# Patient Record
Sex: FEMALE | Race: OTHER | Employment: OTHER | URBAN - METROPOLITAN AREA
[De-identification: names, ages, dates, MRNs, and addresses within clinical notes are randomized per-mention and may not be internally consistent; named-entity substitution may affect disease eponyms.]

---

## 2017-04-30 ENCOUNTER — HOSPITAL ENCOUNTER (OUTPATIENT)
Age: 77
Discharge: ANOTHER HOSPITAL | End: 2017-04-30
Attending: FAMILY MEDICINE
Payer: MEDICAID

## 2017-04-30 ENCOUNTER — HOSPITAL ENCOUNTER (EMERGENCY)
Facility: HOSPITAL | Age: 77
Discharge: HOME | End: 2017-04-30
Attending: EMERGENCY MEDICINE
Payer: MEDICAID

## 2017-04-30 ENCOUNTER — APPOINTMENT (OUTPATIENT)
Dept: GENERAL RADIOLOGY | Age: 77
End: 2017-04-30
Attending: FAMILY MEDICINE
Payer: MEDICAID

## 2017-04-30 VITALS
OXYGEN SATURATION: 97 % | HEART RATE: 73 BPM | DIASTOLIC BLOOD PRESSURE: 81 MMHG | BODY MASS INDEX: 36.72 KG/M2 | WEIGHT: 207.25 LBS | TEMPERATURE: 98 F | RESPIRATION RATE: 20 BRPM | SYSTOLIC BLOOD PRESSURE: 156 MMHG | HEIGHT: 63 IN

## 2017-04-30 VITALS
OXYGEN SATURATION: 97 % | SYSTOLIC BLOOD PRESSURE: 141 MMHG | TEMPERATURE: 98 F | HEART RATE: 77 BPM | DIASTOLIC BLOOD PRESSURE: 85 MMHG | RESPIRATION RATE: 24 BRPM

## 2017-04-30 DIAGNOSIS — R06.02 SHORTNESS OF BREATH: Primary | ICD-10-CM

## 2017-04-30 DIAGNOSIS — J98.01 ACUTE BRONCHOSPASM: ICD-10-CM

## 2017-04-30 DIAGNOSIS — J06.9 UPPER RESPIRATORY TRACT INFECTION, UNSPECIFIED TYPE: Primary | ICD-10-CM

## 2017-04-30 PROCEDURE — 99285 EMERGENCY DEPT VISIT HI MDM: CPT

## 2017-04-30 PROCEDURE — 93005 ELECTROCARDIOGRAM TRACING: CPT

## 2017-04-30 PROCEDURE — 93010 ELECTROCARDIOGRAM REPORT: CPT

## 2017-04-30 PROCEDURE — 83880 ASSAY OF NATRIURETIC PEPTIDE: CPT | Performed by: EMERGENCY MEDICINE

## 2017-04-30 PROCEDURE — 85025 COMPLETE CBC W/AUTO DIFF WBC: CPT | Performed by: EMERGENCY MEDICINE

## 2017-04-30 PROCEDURE — 94640 AIRWAY INHALATION TREATMENT: CPT

## 2017-04-30 PROCEDURE — 99284 EMERGENCY DEPT VISIT MOD MDM: CPT

## 2017-04-30 PROCEDURE — 84484 ASSAY OF TROPONIN QUANT: CPT | Performed by: EMERGENCY MEDICINE

## 2017-04-30 PROCEDURE — 80053 COMPREHEN METABOLIC PANEL: CPT | Performed by: EMERGENCY MEDICINE

## 2017-04-30 PROCEDURE — 99205 OFFICE O/P NEW HI 60 MIN: CPT

## 2017-04-30 PROCEDURE — 71020 XR CHEST PA + LAT CHEST (CPT=71020): CPT

## 2017-04-30 PROCEDURE — 36415 COLL VENOUS BLD VENIPUNCTURE: CPT

## 2017-04-30 RX ORDER — PREDNISONE 20 MG/1
40 TABLET ORAL ONCE
Status: COMPLETED | OUTPATIENT
Start: 2017-04-30 | End: 2017-04-30

## 2017-04-30 RX ORDER — ALBUTEROL SULFATE 90 UG/1
2 AEROSOL, METERED RESPIRATORY (INHALATION) EVERY 4 HOURS PRN
Qty: 1 INHALER | Refills: 0 | Status: SHIPPED | OUTPATIENT
Start: 2017-04-30 | End: 2017-05-30

## 2017-04-30 RX ORDER — PREDNISONE 20 MG/1
40 TABLET ORAL DAILY
Qty: 10 TABLET | Refills: 0 | Status: SHIPPED | OUTPATIENT
Start: 2017-04-30 | End: 2017-05-05

## 2017-04-30 RX ORDER — SIMVASTATIN 20 MG
20 TABLET ORAL NIGHTLY
COMMUNITY
End: 2017-05-30

## 2017-04-30 RX ORDER — IPRATROPIUM BROMIDE AND ALBUTEROL SULFATE 2.5; .5 MG/3ML; MG/3ML
3 SOLUTION RESPIRATORY (INHALATION) ONCE
Status: COMPLETED | OUTPATIENT
Start: 2017-04-30 | End: 2017-04-30

## 2017-04-30 RX ORDER — METOPROLOL TARTRATE 100 MG/1
100 TABLET ORAL 2 TIMES DAILY
COMMUNITY
End: 2017-11-20

## 2017-04-30 NOTE — ED PROVIDER NOTES
Patient Seen in: [facility] In [facility]    History   Patient presents with:  Shortness Of Breath    Stated Complaint: SOB X 1 DAY    HPI    This 39-year-old female is brought to the office by her son for evaluation of shortness of breath wh 04/30/17 1441 None (Room air)       Current:/81 mmHg  Pulse 91  Temp(Src) 97.6 °F (36.4 °C) (Temporal)  Resp 26  SpO2 97%        Physical Exam    General: WH/WN/WD, with audible wheezing noted, A and O times 3  HEAD: Normocephalic, atraumatic  EYES: patient has been reexamined after each of those nebulizer treatments. She continues to have diffuse inspiratory and expiratory wheezing. She continues to feel short of breath. I advised the son that there is no evidence for pneumonia.   I suspect she h

## 2017-04-30 NOTE — ED INITIAL ASSESSMENT (HPI)
Pt c/o cough 3 days and BRAYAN and wheezing onset last night, pt denies pain, pt reports fever of 100.5 earlier today at 1000. Pt has increased swelling to LE. Pt was seen at immediate care today, CXR done, neg for pneumonia, sent her for further work up.

## 2017-04-30 NOTE — ED PROVIDER NOTES
Patient Seen in: [facility] Emergency Department    History   Patient presents with:  Dyspnea PENNY SOB (respiratory)    Stated Complaint: penny    HPI    26-year-old female presents with shortness of breath and cough.   Family reports fever to 100.5°F e All other systems reviewed and negative except as noted above. PSFH elements reviewed from today and agreed except as otherwise stated in HPI.     Physical Exam       ED Triage Vitals   BP 04/30/17 1730 161/80 mmHg   Pulse 04/30/17 1730 75   Resp 04/ Please view results for these tests on the individual orders. RAINBOW DRAW BLUE   RAINBOW DRAW GOLD   RAINBOW DRAW LAVENDER   RAINBOW DRAW LIGHT GREEN      EKG    Rate, intervals and axes as noted on EKG Report.   Rate: 72  Rhythm: Sinus Rhythm  Readi unspecified type  (primary encounter diagnosis)    Disposition:  Discharge    Follow-up:  Natalie Akers, [address] [facility]  [phone]    Schedule an appointment as soon as possible for a visit        Medications Prescribed:

## 2017-04-30 NOTE — ED INITIAL ASSESSMENT (HPI)
Shortness of breath- started yesterday night  Cough- x 2 days  Took mucinex, cough syrup and night and cough drops. Pt son [place].    Fever-  100.5 temp this morning she took ibuprofen 1030 am

## 2017-05-01 NOTE — ED NOTES
RE-EVAL PER MD AND OK FOR DC WITH FU INST TO PMD IN 1-2 DAYS AS DIRECTED. REVIEW OF LABS/EKG, QUESTIONS ANSWERED NM JAMARCUS LUI  IN AGREEMENT WITH POC.  AMB + GAIT WITH SPOUSE

## 2017-05-09 ENCOUNTER — OFFICE VISIT (OUTPATIENT)
Dept: FAMILY MEDICINE CLINIC | Facility: CLINIC | Age: 77
End: 2017-05-09

## 2017-05-09 ENCOUNTER — MED REC SCAN ONLY (OUTPATIENT)
Dept: FAMILY MEDICINE CLINIC | Facility: CLINIC | Age: 77
End: 2017-05-09

## 2017-05-09 VITALS
HEART RATE: 74 BPM | RESPIRATION RATE: 16 BRPM | OXYGEN SATURATION: 98 % | SYSTOLIC BLOOD PRESSURE: 122 MMHG | HEIGHT: 63 IN | WEIGHT: 207 LBS | TEMPERATURE: 99 F | BODY MASS INDEX: 36.68 KG/M2 | DIASTOLIC BLOOD PRESSURE: 80 MMHG

## 2017-05-09 DIAGNOSIS — J40 BRONCHITIS: Primary | ICD-10-CM

## 2017-05-09 DIAGNOSIS — Z76.89 ESTABLISHING CARE WITH NEW DOCTOR, ENCOUNTER FOR: ICD-10-CM

## 2017-05-09 DIAGNOSIS — M81.0 OSTEOPOROSIS, UNSPECIFIED OSTEOPOROSIS TYPE, UNSPECIFIED PATHOLOGICAL FRACTURE PRESENCE: ICD-10-CM

## 2017-05-09 PROBLEM — E66.9 OBESITY: Status: ACTIVE | Noted: 2017-05-09

## 2017-05-09 PROBLEM — E78.00 HIGH CHOLESTEROL: Status: ACTIVE | Noted: 2017-05-09

## 2017-05-09 PROBLEM — I10 HYPERTENSION: Status: ACTIVE | Noted: 2017-05-09

## 2017-05-09 PROCEDURE — 99203 OFFICE O/P NEW LOW 30 MIN: CPT | Performed by: FAMILY MEDICINE

## 2017-05-09 RX ORDER — ALPRAZOLAM 0.5 MG/1
TABLET ORAL
Refills: 5 | COMMUNITY
Start: 2017-02-09 | End: 2017-06-02

## 2017-05-09 RX ORDER — LISINOPRIL 10 MG/1
10 TABLET ORAL DAILY
Qty: 30 TABLET | Refills: 2 | Status: SHIPPED | OUTPATIENT
Start: 2017-05-09 | End: 2017-08-23

## 2017-05-09 RX ORDER — TOLTERODINE 4 MG/1
4 CAPSULE, EXTENDED RELEASE ORAL DAILY
Qty: 30 CAPSULE | Refills: 0 | COMMUNITY
Start: 2017-05-09 | End: 2017-09-14

## 2017-05-09 RX ORDER — AZITHROMYCIN 250 MG/1
TABLET, FILM COATED ORAL
Qty: 6 TABLET | Refills: 0 | Status: SHIPPED | OUTPATIENT
Start: 2017-05-09 | End: 2017-11-29 | Stop reason: ALTCHOICE

## 2017-05-09 RX ORDER — METOPROLOL SUCCINATE 100 MG/1
100 TABLET, EXTENDED RELEASE ORAL DAILY
Qty: 30 TABLET | Refills: 2 | Status: SHIPPED | OUTPATIENT
Start: 2017-05-09 | End: 2017-08-23

## 2017-05-09 RX ORDER — ASPIRIN 81 MG/1
TABLET ORAL
COMMUNITY
Start: 2017-05-05 | End: 2017-09-15

## 2017-05-09 RX ORDER — IBANDRONATE SODIUM 150 MG/1
TABLET, FILM COATED ORAL
Refills: 2 | COMMUNITY
Start: 2017-03-11 | End: 2017-06-02

## 2017-05-09 NOTE — PATIENT INSTRUCTIONS
Bronchitis, Antibiotic Treatment (Adult)    Bronchitis is an infection of the air passages (bronchial tubes) in your lungs. It often occurs when you have a cold.  This illness is contagious during the first few days and is spread through the air by coughi · Finish all antibiotic medicine. Do this even if you are feeling better after only a few days. Follow-up care  Follow up with your healthcare provider, or as advised. If you had an X-ray or ECG (electrocardiogram), a specialist will review it.  You will b During young adulthood, bones become their strongest. This is called peak bone mass. The same good habits that kept bones healthy in childhood help keep bone healthy in adulthood. Age 27 to menopause  Bone mass declines slightly during these years.  Your b

## 2017-05-09 NOTE — PROGRESS NOTES
HPI:    Patient ID: Susana  is a 67year old female. HPI   76yr obese female presents with son for c/o cough and to establish care. C/o cough, wheezing and cough congestion over the past 2wks.  Was seen in the UC and ER on 4/30 and diagnosed wit Take 1 capsule (4 mg total) by mouth daily. Disp: 30 capsule Rfl: 0   Metoprolol Tartrate 100 MG Oral Tab Take 100 mg by mouth 2 (two) times daily. Disp:  Rfl:    simvastatin 20 MG Oral Tab Take 20 mg by mouth nightly.  Disp:  Rfl:    Albuterol Sulfate HFA tablet 2      Sig: Take 1 tablet (10 mg total) by mouth daily. Metoprolol Succinate  MG Oral Tablet 24 Hr 30 tablet 2      Sig: Take 1 tablet (100 mg total) by mouth daily.            Imaging & Referrals:  XR DEXA BONE DENSITOMETRY (CPT=90180)

## 2017-05-30 RX ORDER — SIMVASTATIN 20 MG
20 TABLET ORAL NIGHTLY
Qty: 90 TABLET | Refills: 0 | Status: SHIPPED | OUTPATIENT
Start: 2017-05-30 | End: 2017-08-24

## 2017-05-30 NOTE — TELEPHONE ENCOUNTER
Refill request for simvastatin. Protocol failed, unable to approve. LOV 5/9/2017.  Please advise, thank you

## 2017-06-02 DIAGNOSIS — M81.0 OSTEOPOROSIS, UNSPECIFIED OSTEOPOROSIS TYPE, UNSPECIFIED PATHOLOGICAL FRACTURE PRESENCE: Primary | ICD-10-CM

## 2017-06-02 NOTE — TELEPHONE ENCOUNTER
Refills have been requested for Alprazolam and Ibandronate which Pt was taking prior to 05/09/17 Appt. Will you authorize the pended refills ? Routed to Dr Kwasi Jackson.

## 2017-06-03 RX ORDER — ALPRAZOLAM 0.5 MG/1
TABLET ORAL
Qty: 30 TABLET | Refills: 0 | OUTPATIENT
Start: 2017-06-03 | End: 2017-08-24

## 2017-06-03 RX ORDER — IBANDRONATE SODIUM 150 MG/1
TABLET, FILM COATED ORAL
Qty: 1 TABLET | Refills: 0 | Status: SHIPPED | OUTPATIENT
Start: 2017-06-03 | End: 2017-08-01

## 2017-06-03 NOTE — TELEPHONE ENCOUNTER
Pt needs updated DEXA scan to evaluate continued need for alendronate. Pls have pt schedule and then f/u in clinic.

## 2017-07-10 DIAGNOSIS — M81.0 OSTEOPOROSIS, UNSPECIFIED OSTEOPOROSIS TYPE, UNSPECIFIED PATHOLOGICAL FRACTURE PRESENCE: ICD-10-CM

## 2017-07-10 RX ORDER — IBANDRONATE SODIUM 150 MG/1
TABLET, FILM COATED ORAL
Qty: 1 TABLET | Refills: 0 | OUTPATIENT
Start: 2017-07-10

## 2017-07-10 NOTE — TELEPHONE ENCOUNTER
Pending Prescriptions Disp Refills    Ibandronate Sodium 150 MG Oral Tab 1 tablet 0     Sig: TAKE 1TAB BY MOUTH EVERY MONTH ON EMPTY STOMACH WITH GLASS OF WATER. DONT EAT/LIE DOWN X60MIN AFTER       Lov5/9/2017, No future appointments. patient was only gi

## 2017-08-01 ENCOUNTER — TELEPHONE (OUTPATIENT)
Dept: FAMILY MEDICINE CLINIC | Facility: CLINIC | Age: 77
End: 2017-08-01

## 2017-08-01 DIAGNOSIS — M81.0 OSTEOPOROSIS, UNSPECIFIED OSTEOPOROSIS TYPE, UNSPECIFIED PATHOLOGICAL FRACTURE PRESENCE: ICD-10-CM

## 2017-08-01 RX ORDER — IBANDRONATE SODIUM 150 MG/1
TABLET, FILM COATED ORAL
Qty: 1 TABLET | Refills: 0 | Status: SHIPPED | OUTPATIENT
Start: 2017-08-01 | End: 2017-09-14

## 2017-08-01 NOTE — TELEPHONE ENCOUNTER
Signed Prescriptions Disp Refills    Ibandronate Sodium 150 MG Oral Tab 1 tablet 0      Sig: TAKE 1TAB BY MOUTH EVERY MONTH ON EMPTY STOMACH WITH GLASS OF WATER.  DONT EAT/LIE DOWN [id number] AFTER        Authorizing Provider: Alonzo Larios

## 2017-08-07 ENCOUNTER — TELEPHONE (OUTPATIENT)
Dept: FAMILY MEDICINE CLINIC | Facility: CLINIC | Age: 77
End: 2017-08-07

## 2017-08-07 NOTE — TELEPHONE ENCOUNTER
[facility] pharmacy called to check status on prior authorization for Ibandronate Sodium 150 MG Oral Tab

## 2017-08-09 ENCOUNTER — TELEPHONE (OUTPATIENT)
Dept: FAMILY MEDICINE CLINIC | Facility: CLINIC | Age: 77
End: 2017-08-09

## 2017-08-09 NOTE — TELEPHONE ENCOUNTER
Erlinda Sahni from [facility] called to f/u to see if Prior Auth was recd for the \"Ibandronate\".    Fax # [phone]

## 2017-08-11 ENCOUNTER — MED REC SCAN ONLY (OUTPATIENT)
Dept: FAMILY MEDICINE CLINIC | Facility: CLINIC | Age: 77
End: 2017-08-11

## 2017-08-16 ENCOUNTER — TELEPHONE (OUTPATIENT)
Dept: FAMILY MEDICINE CLINIC | Facility: CLINIC | Age: 77
End: 2017-08-16

## 2017-08-23 RX ORDER — METOPROLOL SUCCINATE 100 MG/1
100 TABLET, EXTENDED RELEASE ORAL DAILY
Qty: 30 TABLET | Refills: 2 | Status: SHIPPED | OUTPATIENT
Start: 2017-08-23 | End: 2017-08-24

## 2017-08-23 RX ORDER — LISINOPRIL 10 MG/1
10 TABLET ORAL DAILY
Qty: 30 TABLET | Refills: 2 | Status: SHIPPED | OUTPATIENT
Start: 2017-08-23 | End: 2017-08-24

## 2017-08-23 RX ORDER — SIMVASTATIN 20 MG
20 TABLET ORAL NIGHTLY
Qty: 90 TABLET | Refills: 0 | Status: CANCELLED | OUTPATIENT
Start: 2017-08-23

## 2017-08-24 DIAGNOSIS — E78.00 HIGH CHOLESTEROL: Primary | ICD-10-CM

## 2017-08-24 DIAGNOSIS — I10 ESSENTIAL HYPERTENSION: ICD-10-CM

## 2017-08-24 DIAGNOSIS — E55.9 VITAMIN D DEFICIENCY: ICD-10-CM

## 2017-08-24 RX ORDER — SIMVASTATIN 20 MG
20 TABLET ORAL NIGHTLY
Qty: 30 TABLET | Refills: 0 | Status: SHIPPED | OUTPATIENT
Start: 2017-08-24 | End: 2017-09-14

## 2017-08-24 RX ORDER — METOPROLOL SUCCINATE 100 MG/1
100 TABLET, EXTENDED RELEASE ORAL DAILY
Qty: 90 TABLET | Refills: 0 | Status: SHIPPED | OUTPATIENT
Start: 2017-08-24 | End: 2018-01-01 | Stop reason: ALTCHOICE

## 2017-08-24 RX ORDER — LISINOPRIL 10 MG/1
10 TABLET ORAL DAILY
Qty: 90 TABLET | Refills: 0 | Status: SHIPPED | OUTPATIENT
Start: 2017-08-24 | End: 2017-11-20

## 2017-08-24 NOTE — TELEPHONE ENCOUNTER
Pending Prescriptions Disp Refills    ALPRAZolam 0.5 MG Oral Tab 30 tablet 0     Sig: TAKE 1 TABLET BY MOUTH EVERY DAY AT BEDTIME       Signed Prescriptions Disp Refills    simvastatin 20 MG Oral Tab 30 tablet 0      Sig: Take 1 tablet (20 mg total) by m

## 2017-08-25 RX ORDER — ALPRAZOLAM 0.5 MG/1
TABLET ORAL
Qty: 30 TABLET | Refills: 0 | Status: SHIPPED | OUTPATIENT
Start: 2017-08-25 | End: 2017-11-29

## 2017-09-07 ENCOUNTER — HOSPITAL ENCOUNTER (OUTPATIENT)
Dept: BONE DENSITY | Age: 77
Discharge: HOME | End: 2017-09-07
Attending: FAMILY MEDICINE
Payer: COMMERCIAL

## 2017-09-07 DIAGNOSIS — M81.0 OSTEOPOROSIS, UNSPECIFIED OSTEOPOROSIS TYPE, UNSPECIFIED PATHOLOGICAL FRACTURE PRESENCE: ICD-10-CM

## 2017-09-07 PROCEDURE — 77080 DXA BONE DENSITY AXIAL: CPT | Performed by: FAMILY MEDICINE

## 2017-09-08 ENCOUNTER — TELEPHONE (OUTPATIENT)
Dept: FAMILY MEDICINE CLINIC | Facility: CLINIC | Age: 77
End: 2017-09-08

## 2017-09-08 DIAGNOSIS — E66.9 OBESITY, UNSPECIFIED CLASSIFICATION, UNSPECIFIED OBESITY TYPE, UNSPECIFIED WHETHER SERIOUS COMORBIDITY PRESENT: ICD-10-CM

## 2017-09-08 DIAGNOSIS — I10 ESSENTIAL HYPERTENSION: Primary | ICD-10-CM

## 2017-09-08 NOTE — TELEPHONE ENCOUNTER
----- Message from [facility], DO sent at 9/8/2017  3:41 PM CDT -----  Pls have pt f/u on DEXA results and have pt do fasting labs before scheduling f/u visit.

## 2017-09-12 ENCOUNTER — TELEPHONE (OUTPATIENT)
Dept: FAMILY MEDICINE CLINIC | Facility: CLINIC | Age: 77
End: 2017-09-12

## 2017-09-12 ENCOUNTER — APPOINTMENT (OUTPATIENT)
Dept: LAB | Age: 77
End: 2017-09-12
Attending: INTERNAL MEDICINE
Payer: COMMERCIAL

## 2017-09-12 DIAGNOSIS — R35.0 URINARY FREQUENCY: ICD-10-CM

## 2017-09-12 DIAGNOSIS — I10 ESSENTIAL HYPERTENSION: ICD-10-CM

## 2017-09-12 DIAGNOSIS — I10 ESSENTIAL HYPERTENSION: Primary | ICD-10-CM

## 2017-09-12 DIAGNOSIS — E78.00 HIGH CHOLESTEROL: ICD-10-CM

## 2017-09-12 DIAGNOSIS — E66.9 OBESITY, UNSPECIFIED CLASSIFICATION, UNSPECIFIED OBESITY TYPE, UNSPECIFIED WHETHER SERIOUS COMORBIDITY PRESENT: ICD-10-CM

## 2017-09-12 LAB
25-HYDROXYVITAMIN D (TOTAL): 26.8 NG/ML (ref 30–100)
ALBUMIN SERPL-MCNC: 3.7 G/DL (ref 3.5–4.8)
ALP LIVER SERPL-CCNC: 66 U/L (ref 55–142)
ALT SERPL-CCNC: 21 U/L (ref 14–54)
AST SERPL-CCNC: 16 U/L (ref 15–41)
BILIRUB SERPL-MCNC: 1.1 MG/DL (ref 0.1–2)
BILIRUB UR QL STRIP.AUTO: NEGATIVE
BUN BLD-MCNC: 20 MG/DL (ref 8–20)
CALCIUM BLD-MCNC: 9.1 MG/DL (ref 8.3–10.3)
CHLORIDE: 109 MMOL/L (ref 101–111)
CHOLEST SMN-MCNC: 166 MG/DL (ref ?–200)
CO2: 27 MMOL/L (ref 22–32)
COLOR UR AUTO: YELLOW
CREAT BLD-MCNC: 0.97 MG/DL (ref 0.55–1.02)
EST. AVERAGE GLUCOSE BLD GHB EST-MCNC: 120 MG/DL (ref 68–126)
FREE T4: 1.1 NG/DL (ref 0.9–1.8)
GLUCOSE BLD-MCNC: 89 MG/DL (ref 70–99)
GLUCOSE UR STRIP.AUTO-MCNC: NEGATIVE MG/DL
HBA1C MFR BLD HPLC: 5.8 % (ref ?–5.7)
HDLC SERPL-MCNC: 37 MG/DL (ref 45–?)
HDLC SERPL: 4.49 {RATIO} (ref ?–4.44)
KETONES UR STRIP.AUTO-MCNC: NEGATIVE MG/DL
LDLC SERPL CALC-MCNC: 96 MG/DL (ref ?–130)
LDLC SERPL-MCNC: 33 MG/DL (ref 5–40)
LEUKOCYTE ESTERASE UR QL STRIP.AUTO: NEGATIVE
M PROTEIN MFR SERPL ELPH: 7.3 G/DL (ref 6.1–8.3)
NITRITE UR QL STRIP.AUTO: NEGATIVE
NONHDLC SERPL-MCNC: 129 MG/DL (ref ?–130)
PH UR STRIP.AUTO: 5 [PH] (ref 4.5–8)
POTASSIUM SERPL-SCNC: 4.4 MMOL/L (ref 3.6–5.1)
PROT UR STRIP.AUTO-MCNC: NEGATIVE MG/DL
RBC UR QL AUTO: NEGATIVE
SODIUM SERPL-SCNC: 143 MMOL/L (ref 136–144)
SP GR UR STRIP.AUTO: 1.02 (ref 1–1.03)
TRIGLYCERIDES: 167 MG/DL (ref ?–150)
TSI SER-ACNC: 1.82 MIU/ML (ref 0.35–5.5)
UROBILINOGEN UR STRIP.AUTO-MCNC: <2 MG/DL

## 2017-09-12 PROCEDURE — 36415 COLL VENOUS BLD VENIPUNCTURE: CPT | Performed by: FAMILY MEDICINE

## 2017-09-12 PROCEDURE — 81003 URINALYSIS AUTO W/O SCOPE: CPT | Performed by: FAMILY MEDICINE

## 2017-09-12 PROCEDURE — 84443 ASSAY THYROID STIM HORMONE: CPT | Performed by: FAMILY MEDICINE

## 2017-09-12 PROCEDURE — 80061 LIPID PANEL: CPT | Performed by: FAMILY MEDICINE

## 2017-09-12 PROCEDURE — 80053 COMPREHEN METABOLIC PANEL: CPT | Performed by: FAMILY MEDICINE

## 2017-09-12 PROCEDURE — 82306 VITAMIN D 25 HYDROXY: CPT | Performed by: FAMILY MEDICINE

## 2017-09-12 PROCEDURE — 83036 HEMOGLOBIN GLYCOSYLATED A1C: CPT | Performed by: FAMILY MEDICINE

## 2017-09-12 PROCEDURE — 84439 ASSAY OF FREE THYROXINE: CPT | Performed by: FAMILY MEDICINE

## 2017-09-12 NOTE — TELEPHONE ENCOUNTER
Pt is requesting to have a urinalysis test added to orders pt is going to have test done an hour, said pt s/w doctor at last visit about frequent urination issues

## 2017-09-14 ENCOUNTER — OFFICE VISIT (OUTPATIENT)
Dept: FAMILY MEDICINE CLINIC | Facility: CLINIC | Age: 77
End: 2017-09-14

## 2017-09-14 VITALS
HEART RATE: 61 BPM | BODY MASS INDEX: 35.88 KG/M2 | RESPIRATION RATE: 16 BRPM | OXYGEN SATURATION: 96 % | WEIGHT: 202.5 LBS | HEIGHT: 63 IN | TEMPERATURE: 99 F | DIASTOLIC BLOOD PRESSURE: 80 MMHG | SYSTOLIC BLOOD PRESSURE: 134 MMHG

## 2017-09-14 DIAGNOSIS — E78.5 HYPERLIPIDEMIA, UNSPECIFIED HYPERLIPIDEMIA TYPE: ICD-10-CM

## 2017-09-14 DIAGNOSIS — M81.0 OSTEOPOROSIS, UNSPECIFIED OSTEOPOROSIS TYPE, UNSPECIFIED PATHOLOGICAL FRACTURE PRESENCE: ICD-10-CM

## 2017-09-14 DIAGNOSIS — I10 ESSENTIAL HYPERTENSION: ICD-10-CM

## 2017-09-14 DIAGNOSIS — R06.00 DOE (DYSPNEA ON EXERTION): Primary | ICD-10-CM

## 2017-09-14 DIAGNOSIS — N32.81 OAB (OVERACTIVE BLADDER): ICD-10-CM

## 2017-09-14 DIAGNOSIS — R60.0 BILATERAL LOWER EXTREMITY EDEMA: ICD-10-CM

## 2017-09-14 DIAGNOSIS — M81.0 OSTEOPOROSIS WITHOUT CURRENT PATHOLOGICAL FRACTURE, UNSPECIFIED OSTEOPOROSIS TYPE: ICD-10-CM

## 2017-09-14 DIAGNOSIS — Z23 NEED FOR INFLUENZA VACCINATION: ICD-10-CM

## 2017-09-14 DIAGNOSIS — E55.9 VITAMIN D DEFICIENCY: ICD-10-CM

## 2017-09-14 PROCEDURE — 99214 OFFICE O/P EST MOD 30 MIN: CPT | Performed by: FAMILY MEDICINE

## 2017-09-14 PROCEDURE — 90471 IMMUNIZATION ADMIN: CPT | Performed by: FAMILY MEDICINE

## 2017-09-14 PROCEDURE — 90653 IIV ADJUVANT VACCINE IM: CPT | Performed by: FAMILY MEDICINE

## 2017-09-14 RX ORDER — TOLTERODINE 4 MG/1
4 CAPSULE, EXTENDED RELEASE ORAL DAILY
Qty: 30 CAPSULE | Refills: 2 | Status: SHIPPED | OUTPATIENT
Start: 2017-09-14 | End: 2017-10-12

## 2017-09-14 RX ORDER — SIMVASTATIN 20 MG
20 TABLET ORAL NIGHTLY
Qty: 90 TABLET | Refills: 1 | Status: SHIPPED | OUTPATIENT
Start: 2017-09-14 | End: 2017-10-12

## 2017-09-14 RX ORDER — IBANDRONATE SODIUM 150 MG/1
TABLET, FILM COATED ORAL
Qty: 1 TABLET | Refills: 2 | Status: SHIPPED | OUTPATIENT
Start: 2017-09-14 | End: 2017-10-12

## 2017-09-14 NOTE — TELEPHONE ENCOUNTER
Refill as per protocol.     LOV 9/14/2017    Future Appointments  Date Time Provider Kevin Cantu   9/21/2017 1:00 PM [facility] CARD [facility] [place]          Signed Prescriptions Disp Refills    Ibandronate Sodium 150 MG Oral Tab 1 tablet 2

## 2017-09-14 NOTE — PROGRESS NOTES
Ariel Herrera is a 67year old female. HPI:   78yr old female presents with son for f/u on recent labs, DEXA and c/o GONSALEZ and BLE edema and f/u on chronic conditions.   Son has noted that she seems more short of breath with going up a flight of stairs Take 100 mg by mouth 2 (two) times daily. Disp:  Rfl:    aspirin 81 MG Oral Tab EC Take 1 tablet (81 mg total) by mouth every evening.  Disp: 90 tablet Rfl: 2   Ibandronate Sodium 150 MG Oral Tab TAKE 1TAB BY MOUTH EVERY MONTH ON EMPTY STOMACH WITH GLASS OF daily asa 81mg and statin  - CARD ECHO 2D DOPPLER (CPT=93306); Future    3. Essential hypertension  - bp stable  - cpm    4.  Hyperlipidemia, unspecified hyperlipidemia type  - lipid panel (9/12/17): , , HD 37, LDL 96  - cont statin and asa autumn

## 2017-09-15 RX ORDER — ASPIRIN 81 MG/1
81 TABLET ORAL EVERY EVENING
Qty: 90 TABLET | Refills: 2 | Status: SHIPPED | OUTPATIENT
Start: 2017-09-15 | End: 2017-10-12

## 2017-09-15 NOTE — TELEPHONE ENCOUNTER
Pending Prescriptions Disp Refills    aspirin 81 MG Oral Tab EC  0     Lov9/14/2017, Future Appointments  Date Time Provider Kevin Cantu   9/21/2017 1:00 PM [address] ECHO [address] [facility] CARD EM [place]       Please see pended medication, unable to fi

## 2017-09-21 ENCOUNTER — HOSPITAL ENCOUNTER (OUTPATIENT)
Dept: CV DIAGNOSTICS | Facility: HOSPITAL | Age: 77
Discharge: HOME | End: 2017-09-21
Attending: FAMILY MEDICINE
Payer: COMMERCIAL

## 2017-09-21 DIAGNOSIS — R06.00 DOE (DYSPNEA ON EXERTION): ICD-10-CM

## 2017-09-21 DIAGNOSIS — I10 ESSENTIAL HYPERTENSION: ICD-10-CM

## 2017-09-21 DIAGNOSIS — R60.0 BILATERAL LOWER EXTREMITY EDEMA: ICD-10-CM

## 2017-09-21 PROCEDURE — 93306 TTE W/DOPPLER COMPLETE: CPT | Performed by: FAMILY MEDICINE

## 2017-10-10 DIAGNOSIS — M81.0 OSTEOPOROSIS, UNSPECIFIED OSTEOPOROSIS TYPE, UNSPECIFIED PATHOLOGICAL FRACTURE PRESENCE: ICD-10-CM

## 2017-10-10 DIAGNOSIS — N32.81 OAB (OVERACTIVE BLADDER): ICD-10-CM

## 2017-10-10 DIAGNOSIS — E78.5 HYPERLIPIDEMIA, UNSPECIFIED HYPERLIPIDEMIA TYPE: ICD-10-CM

## 2017-10-10 NOTE — TELEPHONE ENCOUNTER
Per pharmacy, patient is no longer with them and they couldn't determine what the telephone message was about.

## 2017-10-11 NOTE — TELEPHONE ENCOUNTER
MRALENE FORD [facility] called re: changes to 2 of pt's Rx's due to Ins issues said [facility] no longer accepts pt's Ins

## 2017-10-12 RX ORDER — SIMVASTATIN 20 MG
20 TABLET ORAL NIGHTLY
Qty: 90 TABLET | Refills: 1 | Status: SHIPPED | OUTPATIENT
Start: 2017-10-12 | End: 2018-05-18

## 2017-10-12 RX ORDER — TOLTERODINE 4 MG/1
4 CAPSULE, EXTENDED RELEASE ORAL DAILY
Qty: 30 CAPSULE | Refills: 2 | Status: SHIPPED | OUTPATIENT
Start: 2017-10-12 | End: 2018-04-26

## 2017-10-12 RX ORDER — IBANDRONATE SODIUM 150 MG/1
TABLET, FILM COATED ORAL
Qty: 1 TABLET | Refills: 2 | Status: SHIPPED | OUTPATIENT
Start: 2017-10-12 | End: 2018-05-23

## 2017-10-12 RX ORDER — ASPIRIN 81 MG/1
81 TABLET ORAL EVERY EVENING
Qty: 90 TABLET | Refills: 2 | Status: SHIPPED | OUTPATIENT
Start: 2017-10-12 | End: 2019-01-01

## 2017-10-12 NOTE — TELEPHONE ENCOUNTER
Patient's son returned call stating that his Mother now has Tez. Updated registration to reflect new ID/Grp #. He asked if you can call him with any problems with the refill.

## 2017-10-12 NOTE — TELEPHONE ENCOUNTER
Signed Prescriptions Disp Refills    Ibandronate Sodium 150 MG Oral Tab 1 tablet 2      Sig: TAKE 1TAB BY MOUTH EVERY MONTH ON EMPTY STOMACH WITH GLASS OF WATER.  DONT EAT/LIE DOWN [id number] AFTER        Authorizing Provider: Dewayne Palm

## 2017-10-12 NOTE — TELEPHONE ENCOUNTER
Informed patients pharmacy that we no longer accept patient insurance and she was to find another provider for prior authorizations regarding her medication.

## 2017-10-12 NOTE — TELEPHONE ENCOUNTER
Per patients pharmacy, insurance will only cover oxybutynin and alendronate , please advise if ok to change from ibandronate and detrol.

## 2017-10-12 NOTE — TELEPHONE ENCOUNTER
Ousmane Stroud from [address] [place] [address]. called - [phone] asking to speak to nurse about updated script just received.

## 2017-10-18 RX ORDER — OXYBUTYNIN CHLORIDE 5 MG/1
5 TABLET ORAL 2 TIMES DAILY
Qty: 60 TABLET | Refills: 2 | Status: SHIPPED | OUTPATIENT
Start: 2017-10-18 | End: 2018-04-26

## 2017-10-18 RX ORDER — ALENDRONATE SODIUM 70 MG/1
70 TABLET ORAL WEEKLY
Qty: 4 TABLET | Refills: 2 | Status: SHIPPED | OUTPATIENT
Start: 2017-10-18 | End: 2018-02-27

## 2017-11-20 DIAGNOSIS — E78.00 HIGH CHOLESTEROL: ICD-10-CM

## 2017-11-20 DIAGNOSIS — I10 ESSENTIAL HYPERTENSION: ICD-10-CM

## 2017-11-20 DIAGNOSIS — E55.9 VITAMIN D DEFICIENCY: ICD-10-CM

## 2017-11-20 RX ORDER — LISINOPRIL 10 MG/1
10 TABLET ORAL DAILY
Qty: 90 TABLET | Refills: 0 | Status: SHIPPED | OUTPATIENT
Start: 2017-11-20 | End: 2018-02-27

## 2017-11-20 RX ORDER — METOPROLOL TARTRATE 100 MG/1
100 TABLET ORAL 2 TIMES DAILY
Qty: 60 TABLET | Refills: 2 | Status: SHIPPED | OUTPATIENT
Start: 2017-11-20 | End: 2018-02-27

## 2017-11-20 NOTE — TELEPHONE ENCOUNTER
Refill as per protocol. LOV 9/14/2017    No future appointments. Signed Prescriptions Disp Refills    lisinopril 10 MG Oral Tab 90 tablet 0      Sig: Take 1 tablet (10 mg total) by mouth daily.         Authorizing Provider: Cherelle Flores

## 2017-11-29 DIAGNOSIS — E55.9 VITAMIN D DEFICIENCY: ICD-10-CM

## 2017-11-29 DIAGNOSIS — I10 ESSENTIAL HYPERTENSION: ICD-10-CM

## 2017-11-29 DIAGNOSIS — E78.00 HIGH CHOLESTEROL: ICD-10-CM

## 2017-11-29 RX ORDER — ALPRAZOLAM 0.5 MG/1
TABLET ORAL
Qty: 30 TABLET | Refills: 0 | Status: SHIPPED | OUTPATIENT
Start: 2017-11-29 | End: 2018-04-26

## 2018-01-01 ENCOUNTER — OFFICE VISIT (OUTPATIENT)
Dept: FAMILY MEDICINE CLINIC | Facility: CLINIC | Age: 78
End: 2018-01-01
Payer: MEDICAID

## 2018-01-01 ENCOUNTER — OFFICE VISIT (OUTPATIENT)
Dept: FAMILY MEDICINE CLINIC | Facility: CLINIC | Age: 78
End: 2018-01-01

## 2018-01-01 ENCOUNTER — TELEPHONE (OUTPATIENT)
Dept: FAMILY MEDICINE CLINIC | Facility: CLINIC | Age: 78
End: 2018-01-01

## 2018-01-01 ENCOUNTER — HOSPITAL ENCOUNTER (OUTPATIENT)
Age: 78
Discharge: HOME | End: 2018-01-01
Payer: MEDICAID

## 2018-01-01 VITALS
HEIGHT: 63 IN | OXYGEN SATURATION: 98 % | WEIGHT: 200 LBS | BODY MASS INDEX: 35.44 KG/M2 | HEART RATE: 60 BPM | RESPIRATION RATE: 18 BRPM | TEMPERATURE: 99 F | SYSTOLIC BLOOD PRESSURE: 110 MMHG | DIASTOLIC BLOOD PRESSURE: 72 MMHG

## 2018-01-01 VITALS
SYSTOLIC BLOOD PRESSURE: 147 MMHG | OXYGEN SATURATION: 98 % | TEMPERATURE: 98 F | DIASTOLIC BLOOD PRESSURE: 61 MMHG | HEART RATE: 57 BPM | WEIGHT: 202 LBS | HEIGHT: 63 IN | RESPIRATION RATE: 20 BRPM | BODY MASS INDEX: 35.79 KG/M2

## 2018-01-01 VITALS
RESPIRATION RATE: 16 BRPM | HEIGHT: 63 IN | TEMPERATURE: 98 F | OXYGEN SATURATION: 97 % | HEART RATE: 74 BPM | DIASTOLIC BLOOD PRESSURE: 90 MMHG | SYSTOLIC BLOOD PRESSURE: 144 MMHG | BODY MASS INDEX: 37.3 KG/M2 | WEIGHT: 210.5 LBS

## 2018-01-01 VITALS
RESPIRATION RATE: 18 BRPM | HEART RATE: 64 BPM | WEIGHT: 203 LBS | TEMPERATURE: 99 F | HEIGHT: 63 IN | DIASTOLIC BLOOD PRESSURE: 72 MMHG | OXYGEN SATURATION: 97 % | BODY MASS INDEX: 35.97 KG/M2 | SYSTOLIC BLOOD PRESSURE: 120 MMHG

## 2018-01-01 DIAGNOSIS — I10 ESSENTIAL HYPERTENSION: ICD-10-CM

## 2018-01-01 DIAGNOSIS — S01.85XA INFECTED DOG BITE OF FACE, INITIAL ENCOUNTER: Primary | ICD-10-CM

## 2018-01-01 DIAGNOSIS — I10 ELEVATED BLOOD PRESSURE READING IN OFFICE WITH DIAGNOSIS OF HYPERTENSION: ICD-10-CM

## 2018-01-01 DIAGNOSIS — F41.9 ANXIETY: ICD-10-CM

## 2018-01-01 DIAGNOSIS — E78.5 HYPERLIPIDEMIA, UNSPECIFIED HYPERLIPIDEMIA TYPE: ICD-10-CM

## 2018-01-01 DIAGNOSIS — E55.9 VITAMIN D DEFICIENCY: ICD-10-CM

## 2018-01-01 DIAGNOSIS — M81.0 OSTEOPOROSIS: ICD-10-CM

## 2018-01-01 DIAGNOSIS — E78.00 HIGH CHOLESTEROL: ICD-10-CM

## 2018-01-01 DIAGNOSIS — R33.9 INCOMPLETE BLADDER EMPTYING: ICD-10-CM

## 2018-01-01 DIAGNOSIS — N32.81 OAB (OVERACTIVE BLADDER): ICD-10-CM

## 2018-01-01 DIAGNOSIS — Z23 NEED FOR INFLUENZA VACCINATION: ICD-10-CM
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
## 2018-01-01 DIAGNOSIS — I10 ELEVATED BLOOD PRESSURE READING IN OFFICE WITH DIAGNOSIS OF HYPERTENSION: Primary | ICD-10-CM

## 2018-01-01 DIAGNOSIS — I10 ESSENTIAL HYPERTENSION: Primary | ICD-10-CM

## 2018-01-01 DIAGNOSIS — K21.9 GASTROESOPHAGEAL REFLUX DISEASE, ESOPHAGITIS PRESENCE NOT SPECIFIED: ICD-10-CM

## 2018-01-01 DIAGNOSIS — E53.8 VITAMIN B12 DEFICIENCY: ICD-10-CM

## 2018-01-01 DIAGNOSIS — L08.9 INFECTED DOG BITE OF FACE, INITIAL ENCOUNTER: Primary | ICD-10-CM

## 2018-01-01 DIAGNOSIS — B35.3 TINEA PEDIS OF BOTH FEET: ICD-10-CM

## 2018-01-01 PROCEDURE — 99214 OFFICE O/P EST MOD 30 MIN: CPT | Performed by: FAMILY MEDICINE

## 2018-01-01 PROCEDURE — 99213 OFFICE O/P EST LOW 20 MIN: CPT

## 2018-01-01 PROCEDURE — 99214 OFFICE O/P EST MOD 30 MIN: CPT

## 2018-01-01 PROCEDURE — 90471 IMMUNIZATION ADMIN: CPT | Performed by: FAMILY MEDICINE

## 2018-01-01 PROCEDURE — 90471 IMMUNIZATION ADMIN: CPT

## 2018-01-01 PROCEDURE — 90653 IIV ADJUVANT VACCINE IM: CPT | Performed by: FAMILY MEDICINE

## 2018-01-01 PROCEDURE — 99213 OFFICE O/P EST LOW 20 MIN: CPT | Performed by: FAMILY MEDICINE

## 2018-01-01 RX ORDER — ERGOCALCIFEROL 1.25 MG/1
CAPSULE ORAL
Qty: 4 CAPSULE | Refills: 0 | Status: SHIPPED | OUTPATIENT
Start: 2018-01-01 | End: 2019-01-01

## 2018-01-01 RX ORDER — OXYBUTYNIN CHLORIDE 5 MG/1
5 TABLET ORAL 2 TIMES DAILY
Qty: 180 TABLET | Refills: 0 | Status: SHIPPED | OUTPATIENT
Start: 2018-01-01 | End: 2019-01-01

## 2018-01-01 RX ORDER — LISINOPRIL AND HYDROCHLOROTHIAZIDE 12.5; 1 MG/1; MG/1
TABLET ORAL
Qty: 30 TABLET | Refills: 0 | Status: SHIPPED | OUTPATIENT
Start: 2018-01-01 | End: 2019-01-01

## 2018-01-01 RX ORDER — OXYBUTYNIN CHLORIDE 5 MG/1
TABLET ORAL
Qty: 60 TABLET | Refills: 0 | Status: SHIPPED | OUTPATIENT
Start: 2018-01-01 | End: 2018-01-01

## 2018-01-01 RX ORDER — CITALOPRAM 10 MG/1
TABLET ORAL
Qty: 30 TABLET | Refills: 0 | OUTPATIENT
Start: 2018-01-01

## 2018-01-01 RX ORDER — LISINOPRIL 10 MG/1
TABLET ORAL
Qty: 30 TABLET | Refills: 0 | OUTPATIENT
Start: 2018-01-01

## 2018-01-01 RX ORDER — LISINOPRIL 10 MG/1
TABLET ORAL
Qty: 30 TABLET | Refills: 0 | Status: SHIPPED | OUTPATIENT
Start: 2018-01-01 | End: 2019-01-01

## 2018-01-01 RX ORDER — LISINOPRIL AND HYDROCHLOROTHIAZIDE 12.5; 1 MG/1; MG/1
TABLET ORAL
Qty: 30 TABLET | Refills: 1 | Status: SHIPPED | OUTPATIENT
Start: 2018-01-01 | End: 2018-01-01

## 2018-01-01 RX ORDER — SIMVASTATIN 20 MG
20 TABLET ORAL NIGHTLY
Qty: 90 TABLET | Refills: 0 | Status: SHIPPED | OUTPATIENT
Start: 2018-01-01 | End: 2019-01-01

## 2018-01-01 RX ORDER — LISINOPRIL AND HYDROCHLOROTHIAZIDE 12.5; 1 MG/1; MG/1
TABLET ORAL
Qty: 30 TABLET | Refills: 0 | Status: SHIPPED | OUTPATIENT
Start: 2018-01-01 | End: 2018-01-01

## 2018-01-01 RX ORDER — CITALOPRAM 10 MG/1
10 TABLET ORAL DAILY
Qty: 90 TABLET | Refills: 0 | Status: SHIPPED | OUTPATIENT
Start: 2018-01-01 | End: 2018-01-01

## 2018-01-01 RX ORDER — CITALOPRAM HYDROBROMIDE 10 MG/1
TABLET ORAL
Qty: 30 TABLET | Refills: 2 | Status: SHIPPED | OUTPATIENT
Start: 2018-01-01 | End: 2018-01-01

## 2018-01-01 RX ORDER — LISINOPRIL AND HYDROCHLOROTHIAZIDE 12.5; 1 MG/1; MG/1
1 TABLET ORAL DAILY
Qty: 90 TABLET | Refills: 0 | Status: SHIPPED | OUTPATIENT
Start: 2018-01-01 | End: 2019-01-01

## 2018-01-01 RX ORDER — CITALOPRAM 10 MG/1
TABLET ORAL
Qty: 30 TABLET | Refills: 1 | Status: SHIPPED | OUTPATIENT
Start: 2018-01-01 | End: 2019-01-01

## 2018-01-01 RX ORDER — SIMVASTATIN 20 MG
TABLET ORAL
Qty: 30 TABLET | Refills: 1 | Status: SHIPPED | OUTPATIENT
Start: 2018-01-01 | End: 2019-01-01

## 2018-01-01 RX ORDER — METOPROLOL TARTRATE 100 MG/1
TABLET ORAL
Qty: 60 TABLET | Refills: 1 | Status: SHIPPED | OUTPATIENT
Start: 2018-01-01

## 2018-01-01 RX ORDER — METOPROLOL TARTRATE 100 MG/1
100 TABLET ORAL 2 TIMES DAILY
Qty: 180 TABLET | Refills: 0 | Status: SHIPPED | OUTPATIENT
Start: 2018-01-01 | End: 2019-01-01

## 2018-01-01 RX ORDER — LISINOPRIL AND HYDROCHLOROTHIAZIDE 12.5; 1 MG/1; MG/1
1 TABLET ORAL DAILY
Qty: 30 TABLET | Refills: 2 | Status: SHIPPED | OUTPATIENT
Start: 2018-01-01 | End: 2018-01-01

## 2018-01-01 RX ORDER — FUROSEMIDE 20 MG/1
TABLET ORAL
Refills: 0 | COMMUNITY
Start: 2018-04-26 | End: 2018-01-01 | Stop reason: ALTCHOICE

## 2018-01-01 RX ORDER — ASPIRIN 81 MG/1
81 TABLET ORAL DAILY
Qty: 90 TABLET | Refills: 0 | Status: SHIPPED | OUTPATIENT
Start: 2018-01-01

## 2018-01-01 RX ORDER — ERGOCALCIFEROL 1.25 MG/1
50000 CAPSULE ORAL WEEKLY
Qty: 12 CAPSULE | Refills: 0 | Status: SHIPPED | OUTPATIENT
Start: 2018-01-01 | End: 2018-01-01

## 2018-01-01 RX ORDER — ALENDRONATE SODIUM 70 MG/1
70 TABLET ORAL WEEKLY
Qty: 12 TABLET | Refills: 0 | Status: SHIPPED | OUTPATIENT
Start: 2018-01-01 | End: 2018-01-01

## 2018-01-01 RX ORDER — KETOCONAZOLE 20 MG/G
1 CREAM TOPICAL DAILY
Qty: 30 G | Refills: 0 | Status: SHIPPED | OUTPATIENT
Start: 2018-01-01 | End: 2019-01-01 | Stop reason: ALTCHOICE

## 2018-01-01 RX ORDER — OXYBUTYNIN CHLORIDE 5 MG/1
5 TABLET ORAL 3 TIMES DAILY
Qty: 270 TABLET | Refills: 0 | Status: SHIPPED | OUTPATIENT
Start: 2018-01-01 | End: 2018-01-01 | Stop reason: CLARIF

## 2018-01-01 RX ORDER — CITALOPRAM 10 MG/1
10 TABLET ORAL EVERY EVENING
Qty: 30 TABLET | Refills: 0 | Status: SHIPPED | OUTPATIENT
Start: 2018-01-01 | End: 2018-01-01

## 2018-01-01 RX ORDER — AMOXICILLIN AND CLAVULANATE POTASSIUM 875; 125 MG/1; MG/1
1 TABLET, FILM COATED ORAL 2 TIMES DAILY
Qty: 20 TABLET | Refills: 0 | Status: SHIPPED | OUTPATIENT
Start: 2018-01-01 | End: 2018-01-01

## 2018-01-01 RX ORDER — OXYBUTYNIN CHLORIDE 5 MG/1
TABLET ORAL
Qty: 60 TABLET | Refills: 0 | OUTPATIENT
Start: 2018-01-01

## 2018-02-01 DIAGNOSIS — I10 ESSENTIAL HYPERTENSION: ICD-10-CM

## 2018-02-01 DIAGNOSIS — E55.9 VITAMIN D DEFICIENCY: ICD-10-CM

## 2018-02-01 DIAGNOSIS — E78.00 HIGH CHOLESTEROL: ICD-10-CM

## 2018-02-01 NOTE — TELEPHONE ENCOUNTER
Pending Prescriptions Disp Refills    ALPRAZOLAM 0.5 MG Oral Tab [Pharmacy Med Name: ALPRAZolam Oral Tablet 0.5 MG] 30 tablet 0     Sig: TAKE 1 TABLET BY MOUTH AT BEDTIME        Lov9/14/2017, No future appointments. last refill 11/29/2017 #30 with no refills, please see pended medication thanks

## 2018-02-27 DIAGNOSIS — E55.9 VITAMIN D DEFICIENCY: ICD-10-CM

## 2018-02-27 DIAGNOSIS — E78.00 HIGH CHOLESTEROL: ICD-10-CM

## 2018-02-27 DIAGNOSIS — I10 ESSENTIAL HYPERTENSION: ICD-10-CM

## 2018-02-27 RX ORDER — ALENDRONATE SODIUM 70 MG/1
TABLET ORAL
Qty: 4 TABLET | Refills: 1 | Status: SHIPPED | OUTPATIENT
Start: 2018-02-27 | End: 2018-05-18

## 2018-02-27 RX ORDER — METOPROLOL TARTRATE 100 MG/1
TABLET ORAL
Qty: 60 TABLET | Refills: 1 | Status: SHIPPED | OUTPATIENT
Start: 2018-02-27 | End: 2018-04-26 | Stop reason: CLARIF

## 2018-02-27 RX ORDER — LISINOPRIL 10 MG/1
TABLET ORAL
Qty: 30 TABLET | Refills: 0 | Status: SHIPPED | OUTPATIENT
Start: 2018-02-27 | End: 2018-03-02

## 2018-03-02 DIAGNOSIS — E78.00 HIGH CHOLESTEROL: ICD-10-CM

## 2018-03-02 DIAGNOSIS — E55.9 VITAMIN D DEFICIENCY: ICD-10-CM

## 2018-03-02 DIAGNOSIS — I10 ESSENTIAL HYPERTENSION: ICD-10-CM

## 2018-03-02 RX ORDER — LISINOPRIL 10 MG/1
TABLET ORAL
Qty: 30 TABLET | Refills: 0 | Status: SHIPPED | OUTPATIENT
Start: 2018-03-02 | End: 2018-05-23

## 2018-03-05 RX ORDER — ALPRAZOLAM 0.5 MG/1
TABLET ORAL
Qty: 30 TABLET | Refills: 0 | OUTPATIENT
Start: 2018-03-05

## 2018-04-24 ENCOUNTER — TELEPHONE (OUTPATIENT)
Dept: FAMILY MEDICINE CLINIC | Facility: CLINIC | Age: 78
End: 2018-04-24

## 2018-04-24 DIAGNOSIS — E55.9 VITAMIN D DEFICIENCY: ICD-10-CM

## 2018-04-24 DIAGNOSIS — E78.00 HIGH CHOLESTEROL: ICD-10-CM

## 2018-04-24 DIAGNOSIS — I10 ESSENTIAL HYPERTENSION: ICD-10-CM

## 2018-04-24 NOTE — TELEPHONE ENCOUNTER
Pt's son called asking for dosage clarification on Pt's Osteoporosis med - Alendronate. He said the Pharmacy told him once a week, he said Dr aguilera 1 per month.

## 2018-04-24 NOTE — TELEPHONE ENCOUNTER
Please review and advise, at [address] on 9/12/17, you advised:  restart ibandronate monthly    But pt was prescribed alendronate once weekly on 2/27/18. Son just wants to clarify.

## 2018-04-24 NOTE — TELEPHONE ENCOUNTER
Son is advised and expresses understanding. He will check with his insurance to see if there is another med she can take once a month that is covered.

## 2018-04-25 ENCOUNTER — TELEPHONE (OUTPATIENT)
Dept: FAMILY MEDICINE CLINIC | Facility: CLINIC | Age: 78
End: 2018-04-25

## 2018-04-25 RX ORDER — ALPRAZOLAM 0.5 MG/1
TABLET ORAL
Qty: 30 TABLET | Refills: 0 | OUTPATIENT
Start: 2018-04-25

## 2018-04-25 NOTE — TELEPHONE ENCOUNTER
Patient requesting refill on medications   No future appointments.   [phone] (home)   LOV 9/14/17  Please advice

## 2018-04-26 ENCOUNTER — TELEPHONE (OUTPATIENT)
Dept: FAMILY MEDICINE CLINIC | Facility: CLINIC | Age: 78
End: 2018-04-26

## 2018-04-26 ENCOUNTER — OFFICE VISIT (OUTPATIENT)
Dept: FAMILY MEDICINE CLINIC | Facility: CLINIC | Age: 78
End: 2018-04-26

## 2018-04-26 VITALS
TEMPERATURE: 98 F | RESPIRATION RATE: 16 BRPM | HEART RATE: 66 BPM | HEIGHT: 63 IN | DIASTOLIC BLOOD PRESSURE: 76 MMHG | BODY MASS INDEX: 37.63 KG/M2 | WEIGHT: 212.38 LBS | SYSTOLIC BLOOD PRESSURE: 142 MMHG | OXYGEN SATURATION: 98 %

## 2018-04-26 DIAGNOSIS — E78.00 HIGH CHOLESTEROL: ICD-10-CM

## 2018-04-26 DIAGNOSIS — E78.5 HYPERLIPIDEMIA, UNSPECIFIED HYPERLIPIDEMIA TYPE: ICD-10-CM

## 2018-04-26 DIAGNOSIS — E55.9 VITAMIN D DEFICIENCY: ICD-10-CM

## 2018-04-26 DIAGNOSIS — R60.0 BILATERAL LOWER EXTREMITY EDEMA: ICD-10-CM

## 2018-04-26 DIAGNOSIS — R20.2 PARESTHESIA OF BOTH FEET: ICD-10-CM

## 2018-04-26 DIAGNOSIS — B35.3 TINEA PEDIS OF LEFT FOOT: ICD-10-CM

## 2018-04-26 DIAGNOSIS — I10 ESSENTIAL HYPERTENSION: ICD-10-CM

## 2018-04-26 DIAGNOSIS — I10 ESSENTIAL HYPERTENSION: Primary | ICD-10-CM

## 2018-04-26 DIAGNOSIS — Z78.9 STRICT VEGETARIAN DIET: ICD-10-CM

## 2018-04-26 DIAGNOSIS — M79.672 LEFT FOOT PAIN: ICD-10-CM

## 2018-04-26 PROCEDURE — 99214 OFFICE O/P EST MOD 30 MIN: CPT | Performed by: FAMILY MEDICINE

## 2018-04-26 RX ORDER — FUROSEMIDE 20 MG/1
20 TABLET ORAL 2 TIMES DAILY
Qty: 10 TABLET | Refills: 0 | Status: SHIPPED | OUTPATIENT
Start: 2018-04-26 | End: 2018-04-26

## 2018-04-26 RX ORDER — OXYBUTYNIN CHLORIDE 5 MG/1
5 TABLET ORAL 2 TIMES DAILY
Qty: 60 TABLET | Refills: 2 | Status: SHIPPED | OUTPATIENT
Start: 2018-04-26 | End: 2018-01-01

## 2018-04-26 RX ORDER — FUROSEMIDE 20 MG/1
20 TABLET ORAL 2 TIMES DAILY
Qty: 10 TABLET | Refills: 0 | Status: SHIPPED | OUTPATIENT
Start: 2018-04-26 | End: 2018-05-01

## 2018-04-26 RX ORDER — KETOCONAZOLE 20 MG/G
1 CREAM TOPICAL DAILY
Qty: 15 G | Refills: 0 | Status: SHIPPED | OUTPATIENT
Start: 2018-04-26 | End: 2018-04-26

## 2018-04-26 RX ORDER — KETOCONAZOLE 20 MG/G
1 CREAM TOPICAL DAILY
Qty: 15 G | Refills: 0 | Status: SHIPPED | OUTPATIENT
Start: 2018-04-26 | End: 2018-01-01

## 2018-04-26 RX ORDER — ALPRAZOLAM 0.5 MG/1
TABLET ORAL
Qty: 30 TABLET | Refills: 0 | COMMUNITY
Start: 2018-04-26 | End: 2018-05-26

## 2018-04-26 RX ORDER — OXYBUTYNIN CHLORIDE 5 MG/1
5 TABLET ORAL 2 TIMES DAILY
Qty: 60 TABLET | Refills: 2 | Status: SHIPPED | OUTPATIENT
Start: 2018-04-26 | End: 2018-04-26

## 2018-04-26 RX ORDER — ALPRAZOLAM 0.5 MG/1
TABLET ORAL
Qty: 30 TABLET | Refills: 0 | Status: SHIPPED | OUTPATIENT
Start: 2018-04-26 | End: 2018-04-26

## 2018-04-26 NOTE — TELEPHONE ENCOUNTER
Pt's son called stating [facility] (listed as # 2 [facility]) Nothing to go to [facility] anymore.

## 2018-04-27 NOTE — PROGRESS NOTES
Francis Martins is a 65year old female. HPI:   71yr old female presents with son for f/u on chronic conditions and med refills. HTN, taking medication as directed. No medication side effects. Has not been checking bp at home. Notes chronic BLE edema. Disp: 90 tablet Rfl: 0   furosemide 20 MG Oral Tab Take 1 tablet (20 mg total) by mouth 2 (two) times daily. Disp: 10 tablet Rfl: 0   ketoconazole 2 % External Cream Apply 1 Application topically daily.  Disp: 15 g Rfl: 0   Oxybutynin Chloride 5 MG Oral Tab borderline controlled  - will cont lisinopril 10mg po daily, metoprolol 100mg er po daily and will add lasix 20mg po bid x 5d to help with BLE  - compliance with the medication is advised  - limit the amount of sodium (salt) in diet <1.5g/d  - eat a heart-

## 2018-05-18 DIAGNOSIS — I10 ESSENTIAL HYPERTENSION: ICD-10-CM

## 2018-05-18 DIAGNOSIS — E78.5 HYPERLIPIDEMIA, UNSPECIFIED HYPERLIPIDEMIA TYPE: ICD-10-CM

## 2018-05-18 DIAGNOSIS — E55.9 VITAMIN D DEFICIENCY: ICD-10-CM

## 2018-05-18 DIAGNOSIS — M81.0 OSTEOPOROSIS: ICD-10-CM

## 2018-05-18 DIAGNOSIS — N32.81 OAB (OVERACTIVE BLADDER): ICD-10-CM

## 2018-05-18 DIAGNOSIS — E78.00 HIGH CHOLESTEROL: ICD-10-CM

## 2018-05-19 RX ORDER — ALENDRONATE SODIUM 70 MG/1
TABLET ORAL
Qty: 4 TABLET | Refills: 0 | Status: SHIPPED | OUTPATIENT
Start: 2018-05-19 | End: 2018-05-23

## 2018-05-19 RX ORDER — METOPROLOL TARTRATE 100 MG/1
TABLET ORAL
Qty: 60 TABLET | Refills: 0 | Status: SHIPPED | OUTPATIENT
Start: 2018-05-19 | End: 2018-01-01

## 2018-05-19 RX ORDER — LISINOPRIL 10 MG/1
TABLET ORAL
Qty: 30 TABLET | Refills: 0 | Status: SHIPPED | OUTPATIENT
Start: 2018-05-19 | End: 2018-05-23

## 2018-05-19 RX ORDER — SIMVASTATIN 20 MG
TABLET ORAL
Qty: 30 TABLET | Refills: 0 | Status: SHIPPED | OUTPATIENT
Start: 2018-05-19 | End: 2018-05-23

## 2018-05-22 DIAGNOSIS — E78.5 HYPERLIPIDEMIA, UNSPECIFIED HYPERLIPIDEMIA TYPE: ICD-10-CM

## 2018-05-22 DIAGNOSIS — E55.9 VITAMIN D DEFICIENCY: ICD-10-CM

## 2018-05-22 DIAGNOSIS — E78.00 HIGH CHOLESTEROL: ICD-10-CM

## 2018-05-22 DIAGNOSIS — M81.0 OSTEOPOROSIS: ICD-10-CM

## 2018-05-22 DIAGNOSIS — I10 ESSENTIAL HYPERTENSION: ICD-10-CM

## 2018-05-22 DIAGNOSIS — N32.81 OAB (OVERACTIVE BLADDER): ICD-10-CM

## 2018-05-23 RX ORDER — ALENDRONATE SODIUM 70 MG/1
TABLET ORAL
Qty: 4 TABLET | Refills: 11 | Status: SHIPPED | OUTPATIENT
Start: 2018-05-23 | End: 2019-01-01

## 2018-05-23 RX ORDER — METOPROLOL TARTRATE 100 MG/1
TABLET ORAL
Qty: 60 TABLET | Refills: 1 | Status: SHIPPED | OUTPATIENT
Start: 2018-05-23 | End: 2018-01-01

## 2018-05-23 RX ORDER — LISINOPRIL 10 MG/1
TABLET ORAL
Qty: 30 TABLET | Refills: 1 | Status: SHIPPED | OUTPATIENT
Start: 2018-05-23 | End: 2018-01-01 | Stop reason: ALTCHOICE

## 2018-05-23 RX ORDER — SIMVASTATIN 20 MG
TABLET ORAL
Qty: 30 TABLET | Refills: 2 | Status: SHIPPED | OUTPATIENT
Start: 2018-05-23 | End: 2018-01-01

## 2018-05-23 NOTE — TELEPHONE ENCOUNTER
LOV 4/18 The patient is asked to return in 3mo, sooner if needed.  Visit date not found      LAST LAB 9/17     LAST RX   SIMVASTATIN 20 MG Oral Tab 30 tablet 0 5/19/2018         PROTOCOL  Cholesterol Medication Protocol Passed  Refilled x 3 months    Hypert

## 2018-05-26 DIAGNOSIS — E78.00 HIGH CHOLESTEROL: ICD-10-CM

## 2018-05-26 DIAGNOSIS — E55.9 VITAMIN D DEFICIENCY: ICD-10-CM

## 2018-05-26 DIAGNOSIS — I10 ESSENTIAL HYPERTENSION: ICD-10-CM

## 2018-05-29 ENCOUNTER — HOSPITAL ENCOUNTER (OUTPATIENT)
Dept: GENERAL RADIOLOGY | Age: 78
Discharge: HOME | End: 2018-05-29
Attending: FAMILY MEDICINE
Payer: MEDICAID

## 2018-05-29 DIAGNOSIS — E78.00 HIGH CHOLESTEROL: ICD-10-CM

## 2018-05-29 DIAGNOSIS — I10 ESSENTIAL HYPERTENSION: ICD-10-CM

## 2018-05-29 DIAGNOSIS — M79.672 LEFT FOOT PAIN: ICD-10-CM

## 2018-05-29 DIAGNOSIS — E55.9 VITAMIN D DEFICIENCY: ICD-10-CM

## 2018-05-29 PROCEDURE — 73630 X-RAY EXAM OF FOOT: CPT | Performed by: FAMILY MEDICINE

## 2018-05-29 RX ORDER — ALPRAZOLAM 0.5 MG/1
TABLET ORAL
Qty: 30 TABLET | Refills: 0 | Status: SHIPPED | OUTPATIENT
Start: 2018-05-29 | End: 2019-01-01

## 2018-05-29 NOTE — TELEPHONE ENCOUNTER
4/26/2018  Future Appointments  Date Time Provider Kevin Alondra   5/29/2018 1:00 PM [facility] X[facility]1 [facility] XRAY [place]   5/31/2018 10:00 AM [facility] [place]       Telephone Information:   Home Phone [phone]   Mobile [phone]

## 2018-05-31 ENCOUNTER — APPOINTMENT (OUTPATIENT)
Dept: LAB | Age: 78
End: 2018-05-31
Attending: FAMILY MEDICINE
Payer: MEDICAID

## 2018-05-31 DIAGNOSIS — E55.9 VITAMIN D DEFICIENCY: ICD-10-CM

## 2018-05-31 DIAGNOSIS — Z78.9 STRICT VEGETARIAN DIET: ICD-10-CM

## 2018-05-31 DIAGNOSIS — R20.2 PARESTHESIA OF BOTH FEET: ICD-10-CM

## 2018-05-31 DIAGNOSIS — I10 ESSENTIAL HYPERTENSION: ICD-10-CM

## 2018-05-31 DIAGNOSIS — E78.5 HYPERLIPIDEMIA, UNSPECIFIED HYPERLIPIDEMIA TYPE: ICD-10-CM

## 2018-05-31 PROCEDURE — 83036 HEMOGLOBIN GLYCOSYLATED A1C: CPT

## 2018-05-31 PROCEDURE — 80053 COMPREHEN METABOLIC PANEL: CPT

## 2018-05-31 PROCEDURE — 82607 VITAMIN B-12: CPT

## 2018-05-31 PROCEDURE — 82306 VITAMIN D 25 HYDROXY: CPT

## 2018-05-31 PROCEDURE — 36415 COLL VENOUS BLD VENIPUNCTURE: CPT

## 2018-05-31 PROCEDURE — 80061 LIPID PANEL: CPT

## 2018-05-31 RX ORDER — ALPRAZOLAM 0.5 MG/1
TABLET ORAL
Qty: 30 TABLET | Refills: 0 | OUTPATIENT
Start: 2018-05-31

## 2018-05-31 NOTE — TELEPHONE ENCOUNTER
Refill appears to be sent 5/29, pls confirm with pharmacy. If it has not been sent, pls call in rx for pt for 30d supply with 1 refill.

## 2018-05-31 NOTE — TELEPHONE ENCOUNTER
4/26/2018  No future appointments.     Telephone Information:   Home Phone [phone]   Mobile [phone]     No protocol please approve or deny request .

## 2018-07-22 PROBLEM — E53.8 VITAMIN B12 DEFICIENCY: Status: ACTIVE | Noted: 2018-01-01

## 2018-07-23 NOTE — PROGRESS NOTES
Tilda Apgar is a 65year old female. HPI:   71yr old female presents with son for f/u on chronic conditions and recent test results. HTN, taking medication as directed. No medication side effects. Has not been checking home bp readings.  Still hav BY MOUTH ONE TIME A WEEK  Disp: 4 tablet Rfl: 11   ketoconazole 2 % External Cream Apply 1 Application topically daily. Disp: 15 g Rfl: 0   Oxybutynin Chloride 5 MG Oral Tab Take 1 tablet (5 mg total) by mouth 2 (two) times daily.  Disp: 60 tablet Rfl: 2 daily, reviewed indications, dosing and SEs  - cont metoprolol 100mg po daily  - advised to monitor  - Lisinopril-Hydrochlorothiazide 10-12.5 MG Oral Tab; Take 1 tablet by mouth daily. Dispense: 30 tablet; Refill: 2    2.  Anxiety  - pt has symptoms C/W wi

## 2018-07-23 NOTE — TELEPHONE ENCOUNTER
LOV 7/12/18    LAST LAB    LAST RX 7/12/18 #30 only  DENIED AS DUPLICATE, INSTRUCTIONS TO PHARMACY TO CHECK FOR NEW/ REFILLS      Next OV No future appointments.       PROTOCOL

## 2018-07-24 NOTE — TELEPHONE ENCOUNTER
LOV 7/12/18    LAST LAB 5/31/18    LAST RX  Lisinopril-Hydrochlorothiazide 10-12.5 MG Oral Tab 30 tablet 2 7/12/2018 7/7/2019   Sig :  Take 1 tablet by mouth daily. Route:   Oral       Next OV No future appointments.     PROTOCOL  Lisinopril Oral Tablet

## 2018-07-25 NOTE — TELEPHONE ENCOUNTER
LOV 7/12/18    LAST LAB 5/31/18    LAST RX 5/23/18  #60  1 refill    Next OV No future appointments.     PROTOCOL  Metoprolol Tartrate Oral Tablet 100 MG  Last refill: 4/24/2018  Hypertension Medications Protocol Passed7/24 4:31 PM   CMP or BMP in past 12 m

## 2018-07-31 NOTE — TELEPHONE ENCOUNTER
LOV 7/18    LAST LAB    LAST RX   Oxybutynin Chloride 5 MG Oral Tab 60 tablet 2 4/26/2018    Sig :  Take 1 tablet (5 mg total) by mouth 2 (two) times daily. Next OV 8/7/2018      PROTOCOL  Please advise. No protocol. If filled. Please close.    [employer]

## 2018-08-03 NOTE — TELEPHONE ENCOUNTER
Instructions         Return in about 4 weeks (around 8/9/2018), or if symptoms worsen or fail to improve.    Future Appointments  Date Time Provider Kevin Cantu   8/7/2018 4:00 PM Marissa Serrano, DO [facility]

## 2018-08-07 NOTE — PROGRESS NOTES
Margret Bullock is a 65year old female. HPI:   71yr old female presents with son for f/u on hypertension, HL, GERD and c/o incomplete bladder emptying. HTN, taking medication as directed.  Was prescribed lisinopril-hctz 10/12.5mg at last ov and advise Hydrobromide 10 MG Oral Tab Take 1 tablet (10 mg total) by mouth every evening.  Disp: 30 tablet Rfl: 0   SIMVASTATIN 20 MG Oral Tab TAKE 1 TABLET BY MOUTH IN THE EVENING  Disp: 30 tablet Rfl: 2   ALENDRONATE SODIUM 70 MG Oral Tab TAKE 1 TABLET BY MOUTH ONE daily  - low salt diet and increase hydration  - monitor    2. Hyperlipidemia, unspecified hyperlipidemia type  - cont statin and asa nightly  - low fat/chol diet and regular exercise  - monitor    3.  Gastroesophageal reflux disease, esophagitis presence n

## 2018-08-13 NOTE — TELEPHONE ENCOUNTER
LOV 8/18    LAST LAB    LAST RX    Next OV    PROTOCOL  Please advise. No protocol. If filled. Please close.    Thank You

## 2018-08-23 NOTE — TELEPHONE ENCOUNTER
Lisinopril Oral Tablet 10 MG  Will file in chart as: LISINOPRIL 10 MG Oral Tab  The source prescription was discontinued on 3/2/2018 by Marybel Real. TAKE 1 TABLET BY MOUTH ONE TIME A DAY        Disp: 30 tablet (Pharmacy requested 30)   Refills: 0     Class: Normal Start: 8/23/2018   For: Essential hypertension, High cholesterol, Vitamin D deficiency  Originally ordered: 1 year ago by Karon Sanches DO  Last refill: 4/24/2018  Hypertension Medications Protocol Passed8/23 6:35 AM   CMP or BMP in past 12 months    Last serum creatinine< 2.0    Appointment in past 6 or next 3 months   Protocol Details      LOV 8/7/2018    LAST LAB 5/31/2018    LAST RX   7/12/2018 +2 refills    Next OV    PROTOCOL    Patient should have enough medication until October then will need an appointment    No future appointments. Oxybutynin Chloride Oral Tablet 5 MG  Will file in chart as: OXYBUTYNIN CHLORIDE 5 MG Oral Tab  The source prescription was discontinued on 8/22/2018 by Lili Doherty DO.  TAKE 1 TABLET BY MOUTH TWO TIMES A DAY        Disp: 60 tablet (Pharmacy requested 60)   Refills: 0     Class: Normal Start: 8/23/2018   Originally ordered: 10 months ago by Karon Sanches DO  Last refill: 8/4/2018      LOV 8/22/2018    LAST LAB    LAST RX   8/22/2018 30 qty 0 refill    Next OV    PROTOCOL    Please advise. No future appointments.

## 2018-09-20 NOTE — TELEPHONE ENCOUNTER
Name from pharmacy: Simvastatin Oral Tablet 20 MG          Will file in chart as: SIMVASTATIN 20 MG Oral Tab    Sig: TAKE 1 TABLET BY MOUTH IN THE EVENING     Disp:  30 tablet (Pharmacy requested: 30)    Refills:  1    Start: 9/18/2018     Class: Normal

## 2018-09-22 NOTE — TELEPHONE ENCOUNTER
Son called stating that mother got bit by their pet dog near her right eye on Thursday and now has swelling and redness under the eye. He states can see the bite ramiro, has pain. Son would like a prescription for antibiotic called in.   Advised to go to [facility]

## 2018-09-22 NOTE — ED PROVIDER NOTES
Patient Seen in: [facility] In [facility]    History   Patient presents with:  Laceration Abrasion (integumentary)    Stated Complaint: DOG BITE TO EYE AREA    HPI      74-year-old female here with complaint of a dog bite to her right malar r and well-nourished. HENT:   Head: Normocephalic.    Right Ear: Tympanic membrane and external ear normal.   Left Ear: Tympanic membrane and external ear normal.   Nose: Nose normal.   Mouth/Throat: Uvula is midline, oropharynx is clear and moist and mucou possible for a visit           Medications Prescribed:  Current Discharge Medication List    START taking these medications    Amoxicillin-Pot Clavulanate 875-125 MG Oral Tab  Take 1 tablet by mouth 2 (two) times daily for 10 days.   Qty: 20 tablet Refills:

## 2018-09-22 NOTE — ED NOTES
Call to language line for communication directly with patient. Waited for over 15 minutes and was finally told that an  was not available. Son is reliable and has provided interpretation. Small bite wound noted below eye, on right side.

## 2018-09-24 NOTE — TELEPHONE ENCOUNTER
Name from pharmacy: Lisinopril Oral Tablet 10 MG          Will file in chart as: LISINOPRIL 10 MG Oral Tab    The source prescription was discontinued on 3/2/2018 by Miguel Angel Navarro.     Sig: TAKE 1 TABLET BY MOUTH ONE TIME A DAY     Disp:  30 tablet (P

## 2018-09-24 NOTE — TELEPHONE ENCOUNTER
Patient's son returned call. States his mother is doing good today. Follow up appt made with Dr. Loren Loyd for tomorrow.     Future Appointments   Date Time Provider Kevin Cantu   9/25/2018  1:30 PM Sana Cleary,  [facility]

## 2018-09-25 NOTE — PROGRESS NOTES
HPI:    Patient ID: Fernie Rivera is a 65year old female. HPI  71yr old female presents with son for f/u after recent UC visit for dog bite to her face.  States she was sitting next to their pet dog and unsure exactly what happened but she ended up bi ALPRAZOLAM 0.5 MG Oral Tab TAKE 1 TABLET BY MOUTH AT BEDTIME  Disp: 30 tablet Rfl: 0   ALENDRONATE SODIUM 70 MG Oral Tab TAKE 1 TABLET BY MOUTH ONE TIME A WEEK  Disp: 4 tablet Rfl: 11   aspirin 81 MG Oral Tab EC Take 1 tablet (81 mg total) by mouth every

## 2018-10-02 NOTE — TELEPHONE ENCOUNTER
Derek Queen from [facility]. left a v/m stating Pt is traveling out of the country needs 90 day refills on all medication (not 30day).

## 2018-10-02 NOTE — TELEPHONE ENCOUNTER
Spoke to [employer] at [employer]. He requests the medications be ordered electronically for 90 days. All medications listed EXECPT Xanax have been ordered electronically and sent to the [facility].

## 2018-10-02 NOTE — TELEPHONE ENCOUNTER
LOV  9/25/18- dog bite     8/7/18- HTN + 3 more    LAST LAB 5/31/18    Next OV No future appointments.       ERGOCALCIFEROL 21476 units Oral Cap 4 capsule 0 9/20/2018    Sig :  TAKE 1 CAPSULE BY MOUTH ONE TIME A WEEK        LISINOPRIL-HYDROCHLOROTHIAZIDE 10

## 2018-10-02 NOTE — TELEPHONE ENCOUNTER
Son stated his mom is leaving the country for about 2 and half months, leaving in about 8 days. Son stated Dr. Cristy Mancera said that they should call our office and request 90 day Refills when she is getting ready to leave.     Son confirmed that his mom needs

## 2018-10-22 NOTE — TELEPHONE ENCOUNTER
Name from pharmacy: Lisinopril Oral Tablet 10 MG          Will file in chart as: LISINOPRIL 10 MG Oral Tab    The source prescription was discontinued on 3/2/2018 by Richelle Dean.     Sig: TAKE 1 TABLET BY MOUTH ONE TIME A DAY     Disp:  30 tablet (P

## 2018-11-21 NOTE — TELEPHONE ENCOUNTER
Medication Detail     Medication Quantity Refills Start End   CITALOPRAM HYDROBROMIDE 10 MG Oral Tab 30 tablet 2 8/13/2018    Sig :  TAKE 1 TABLET BY MOUTH IN THE EVENING      Route:   (none)       No future appointments.       Last OV 9/25/18    No Protoco

## 2018-12-31 NOTE — TELEPHONE ENCOUNTER
LOV    LAST LAB    LAST RX 11/21/18 #30    Next OV No future appointments.   Instructions         Return in about 3 months (around 11/7/2018), or if symptoms worsen or fail to improve      PROTOCOL  Name from pharmacy: Lisinopril Oral Tablet 10 MG

## 2018-12-31 NOTE — TELEPHONE ENCOUNTER
Refilled x 30 days but pt is overdue for F/U OV    Please contact pt to schedule  Instructions         Return in about 3 months (around 11/7/2018), or if symptoms worsen or fail to improve

## 2019-01-01 ENCOUNTER — LAB ENCOUNTER (OUTPATIENT)
Dept: LAB | Age: 79
End: 2019-01-01
Attending: FAMILY MEDICINE
Payer: MEDICAID

## 2019-01-01 ENCOUNTER — OFFICE VISIT (OUTPATIENT)
Dept: FAMILY MEDICINE CLINIC | Facility: CLINIC | Age: 79
End: 2019-01-01
Payer: MEDICAID

## 2019-01-01 VITALS
OXYGEN SATURATION: 99 % | RESPIRATION RATE: 16 BRPM | DIASTOLIC BLOOD PRESSURE: 68 MMHG | HEIGHT: 63 IN | BODY MASS INDEX: 37.03 KG/M2 | SYSTOLIC BLOOD PRESSURE: 122 MMHG | HEART RATE: 63 BPM | TEMPERATURE: 98 F | WEIGHT: 209 LBS

## 2019-01-01 DIAGNOSIS — E55.9 VITAMIN D DEFICIENCY: ICD-10-CM

## 2019-01-01 DIAGNOSIS — E66.9 OBESITY (BMI 30-39.9): ICD-10-CM

## 2019-01-01 DIAGNOSIS — E78.5 HYPERLIPIDEMIA, UNSPECIFIED HYPERLIPIDEMIA TYPE: ICD-10-CM

## 2019-01-01 DIAGNOSIS — R53.82 CHRONIC FATIGUE: ICD-10-CM

## 2019-01-01 DIAGNOSIS — I10 ELEVATED BLOOD PRESSURE READING IN OFFICE WITH DIAGNOSIS OF HYPERTENSION: ICD-10-CM

## 2019-01-01 DIAGNOSIS — I10 ESSENTIAL HYPERTENSION: ICD-10-CM

## 2019-01-01 DIAGNOSIS — N32.81 OAB (OVERACTIVE BLADDER): ICD-10-CM

## 2019-01-01 DIAGNOSIS — R73.03 PREDIABETES: ICD-10-CM

## 2019-01-01 DIAGNOSIS — R53.82 CHRONIC FATIGUE: Primary | ICD-10-CM

## 2019-01-01 DIAGNOSIS — F41.9 ANXIETY: ICD-10-CM

## 2019-01-01 DIAGNOSIS — M81.0 OSTEOPOROSIS: ICD-10-CM

## 2019-01-01 PROCEDURE — 80053 COMPREHEN METABOLIC PANEL: CPT

## 2019-01-01 PROCEDURE — 80061 LIPID PANEL: CPT

## 2019-01-01 PROCEDURE — 82607 VITAMIN B-12: CPT

## 2019-01-01 PROCEDURE — 84443 ASSAY THYROID STIM HORMONE: CPT

## 2019-01-01 PROCEDURE — 83036 HEMOGLOBIN GLYCOSYLATED A1C: CPT

## 2019-01-01 PROCEDURE — 36415 COLL VENOUS BLD VENIPUNCTURE: CPT

## 2019-01-01 PROCEDURE — 99214 OFFICE O/P EST MOD 30 MIN: CPT | Performed by: FAMILY MEDICINE

## 2019-01-01 PROCEDURE — 85025 COMPLETE CBC W/AUTO DIFF WBC: CPT

## 2019-01-01 PROCEDURE — 82306 VITAMIN D 25 HYDROXY: CPT

## 2019-01-01 RX ORDER — LISINOPRIL AND HYDROCHLOROTHIAZIDE 12.5; 1 MG/1; MG/1
TABLET ORAL
Qty: 15 TABLET | Refills: 0 | Status: SHIPPED | OUTPATIENT
Start: 2019-01-01 | End: 2019-01-01

## 2019-01-01 RX ORDER — SIMVASTATIN 20 MG
TABLET ORAL
Qty: 30 TABLET | Refills: 0 | Status: SHIPPED | OUTPATIENT
Start: 2019-01-01 | End: 2019-01-01

## 2019-01-01 RX ORDER — OXYBUTYNIN CHLORIDE 5 MG/1
5 TABLET ORAL 2 TIMES DAILY
Qty: 180 TABLET | Refills: 0 | OUTPATIENT
Start: 2019-01-01

## 2019-01-01 RX ORDER — OXYBUTYNIN CHLORIDE 5 MG/1
TABLET ORAL
Qty: 30 TABLET | Refills: 0 | Status: SHIPPED | OUTPATIENT
Start: 2019-01-01 | End: 2019-01-01

## 2019-01-01 RX ORDER — LISINOPRIL AND HYDROCHLOROTHIAZIDE 12.5; 1 MG/1; MG/1
TABLET ORAL
Qty: 15 TABLET | Refills: 0 | OUTPATIENT
Start: 2019-01-01

## 2019-01-01 RX ORDER — CITALOPRAM 10 MG/1
TABLET ORAL
Qty: 30 TABLET | Refills: 0 | Status: SHIPPED | OUTPATIENT
Start: 2019-01-01 | End: 2019-01-01

## 2019-01-01 RX ORDER — LISINOPRIL AND HYDROCHLOROTHIAZIDE 12.5; 1 MG/1; MG/1
TABLET ORAL
Qty: 30 TABLET | Refills: 0 | OUTPATIENT
Start: 2019-01-01

## 2019-01-01 RX ORDER — CITALOPRAM 10 MG/1
TABLET ORAL
Qty: 30 TABLET | Refills: 2 | Status: SHIPPED | OUTPATIENT
Start: 2019-01-01

## 2019-01-01 RX ORDER — OXYBUTYNIN CHLORIDE 5 MG/1
TABLET ORAL
Qty: 60 TABLET | Refills: 2 | Status: SHIPPED | OUTPATIENT
Start: 2019-01-01

## 2019-01-01 RX ORDER — SIMVASTATIN 20 MG
TABLET ORAL
Qty: 30 TABLET | Refills: 0 | OUTPATIENT
Start: 2019-01-01

## 2019-01-01 RX ORDER — ERGOCALCIFEROL 1.25 MG/1
CAPSULE ORAL
Qty: 4 CAPSULE | Refills: 0 | OUTPATIENT
Start: 2019-01-01

## 2019-01-01 RX ORDER — ERGOCALCIFEROL 1.25 MG/1
CAPSULE ORAL
Qty: 4 CAPSULE | Refills: 0 | Status: SHIPPED | OUTPATIENT
Start: 2019-01-01 | End: 2019-01-01 | Stop reason: ALTCHOICE

## 2019-01-01 RX ORDER — OXYBUTYNIN CHLORIDE 5 MG/1
TABLET ORAL
Qty: 15 TABLET | Refills: 0 | Status: SHIPPED | OUTPATIENT
Start: 2019-01-01 | End: 2019-01-01

## 2019-01-01 RX ORDER — LISINOPRIL AND HYDROCHLOROTHIAZIDE 12.5; 1 MG/1; MG/1
TABLET ORAL
Qty: 30 TABLET | Refills: 0 | Status: SHIPPED | OUTPATIENT
Start: 2019-01-01 | End: 2019-01-01

## 2019-01-01 RX ORDER — ASPIRIN 81 MG/1
TABLET ORAL
Qty: 30 TABLET | Refills: 1 | Status: SHIPPED | OUTPATIENT
Start: 2019-01-01 | End: 2019-01-01

## 2019-01-01 RX ORDER — CITALOPRAM 10 MG/1
TABLET ORAL
Qty: 15 TABLET | Refills: 0 | Status: SHIPPED | OUTPATIENT
Start: 2019-01-01 | End: 2019-01-01

## 2019-01-01 RX ORDER — ERGOCALCIFEROL 1.25 MG/1
CAPSULE ORAL
Qty: 4 CAPSULE | Refills: 0 | Status: SHIPPED | OUTPATIENT
Start: 2019-01-01 | End: 2019-01-01

## 2019-01-01 RX ORDER — ALPRAZOLAM 0.5 MG/1
TABLET ORAL
Qty: 30 TABLET | Refills: 0 | Status: SHIPPED | OUTPATIENT
Start: 2019-01-01

## 2019-01-01 RX ORDER — ALENDRONATE SODIUM 70 MG/1
70 TABLET ORAL WEEKLY
Qty: 4 TABLET | Refills: 5 | Status: SHIPPED | OUTPATIENT
Start: 2019-01-01

## 2019-01-01 RX ORDER — METOPROLOL TARTRATE 100 MG/1
TABLET ORAL
Qty: 60 TABLET | Refills: 0 | OUTPATIENT
Start: 2019-01-01

## 2019-01-01 RX ORDER — SIMVASTATIN 20 MG
20 TABLET ORAL NIGHTLY
Qty: 90 TABLET | Refills: 0 | OUTPATIENT
Start: 2019-01-01

## 2019-01-01 RX ORDER — LISINOPRIL AND HYDROCHLOROTHIAZIDE 12.5; 1 MG/1; MG/1
TABLET ORAL
Qty: 30 TABLET | Refills: 2 | Status: SHIPPED | OUTPATIENT
Start: 2019-01-01

## 2019-01-01 RX ORDER — METOPROLOL TARTRATE 100 MG/1
100 TABLET ORAL 2 TIMES DAILY
Qty: 60 TABLET | Refills: 2 | Status: SHIPPED | OUTPATIENT
Start: 2019-01-01

## 2019-01-01 RX ORDER — SIMVASTATIN 20 MG
TABLET ORAL
Qty: 30 TABLET | Refills: 5 | Status: SHIPPED | OUTPATIENT
Start: 2019-01-01

## 2019-01-01 RX ORDER — CITALOPRAM 10 MG/1
TABLET ORAL
Qty: 30 TABLET | Refills: 0 | OUTPATIENT
Start: 2019-01-01

## 2019-01-31 NOTE — TELEPHONE ENCOUNTER
LOV  9/18 Return in about 3 months (around 11/7/2018    LAST LAB 5/18    LAST RX   CITALOPRAM HYDROBROMIDE 10 MG Oral Tab 30 tablet 1 11/22/2018     Sig :  TAKE 1 TABLET BY MOUTH ONE TIME A DAY      Route:   (none)     Comment:   Last refill without F/U

## 2019-02-02 NOTE — TELEPHONE ENCOUNTER
Name from pharmacy: Lisinopril-Hydrochlorothiazide Oral Tablet 10-12.5 MG          Will file in chart as: LISINOPRIL-HYDROCHLOROTHIAZIDE 10-12.5 MG Oral Tab    The source prescription was reordered on 12/31/2018 by Vickie Middleton LPN.     Sig: TAKE 1 TA

## 2019-02-02 NOTE — TELEPHONE ENCOUNTER
Medication request is open in another date for Lisinopril- Hydrochlorothiazide   see date 1/30/2019.

## 2019-02-02 NOTE — TELEPHONE ENCOUNTER
LOV 9/25/2018    LAST LAB 05/31/2018    LAST RX 10/02/2018    Next OV No future appointments.     PROTOCOL NONE     Please advise and see other note regarding other medication on 01/20/2019

## 2019-02-05 NOTE — TELEPHONE ENCOUNTER
Patient's son called. Stated the Pharmacy does not have the 4 Refills yet as request 1-30. Son left 3 messages regarding this. Stated his mom is out of the country but he needs to get her medication ASAP.   Declined several times to schedule a Med Chec

## 2019-02-06 NOTE — TELEPHONE ENCOUNTER
Son, ok per FYI, called stating the same message as below, the pharmacy has been trying to get in touch with our clinic since 1/31 for refills. He states he left a voice message with the nurse yesterday and has not gotten a returned call. Please advise.

## 2019-03-02 NOTE — TELEPHONE ENCOUNTER
Name from pharmacy: Lisinopril-hydroCHLOROthiazide Oral Tablet 10-12.5 MG          Will file in chart as: LISINOPRIL-HYDROCHLOROTHIAZIDE 10-12.5 MG Oral Tab    Sig: TAKE 1 TABLET BY MOUTH ONE TIME A DAY     Disp:  30 tablet (Pharmacy requested: 30)    Refi appointment, wants a 30 day refill. Please advise.

## 2019-03-06 NOTE — TELEPHONE ENCOUNTER
LOV 9/18    LAST LAB    LAST RX   LISINOPRIL-HYDROCHLOROTHIAZIDE 10-12.5 MG Oral Tab 30 tablet 0 2/6/2019     simvastatin 20 MG Oral Tab 90 tablet 0 10/2/2018    Sig :  Take 1 tablet (20 mg total) by mouth nightly.            Next OV Visit date not found

## 2019-03-07 NOTE — TELEPHONE ENCOUNTER
Name from pharmacy: Metoprolol Tartrate Oral Tablet 100 MG          Will file in chart as: METOPROLOL TARTRATE 100 MG Oral Tab    Sig: TAKE 1 TABLET BY MOUTH TWO TIMES A DAY     Disp:  60 tablet (Pharmacy requested: 60)    Refills:  0    Start: 3/6/2019

## 2019-03-07 NOTE — TELEPHONE ENCOUNTER
LOV    LAST LAB    LAST RX   LISINOPRIL-HYDROCHLOROTHIAZIDE 10-12.5 MG Oral Tab 30 tablet 0 2/6/2019     simvastatin 20 MG Oral Tab 90 tablet 0 10/2/2018    Sig :  Take 1 tablet (20 mg total) by mouth nightly.            Next OV Visit date not found      [place]

## 2019-04-08 NOTE — TELEPHONE ENCOUNTER
LOV 9-25-18    LAST LAB 5-31-18    LAST RX  3-9-19 on all except oxybutynin which was 10-2-18    Next OV No future appointments.     PROTOCOL    Name from pharmacy: Citalopram Hydrobromide Oral Tablet 10 MG          Will file in chart as: CITALOPRAM HYDROBR Last ordered: 2 months ago by Ilene Melvin DO Last refill: 2/6/2019    Rx #: [id number]    Hypertension Medications Protocol Failed4/4 3:45 PM   Appointment in past 6 or next 3 months    CMP or BMP in past 12 months    Last serum creatinine< 2.0

## 2019-04-10 NOTE — TELEPHONE ENCOUNTER
Name from pharmacy: Simvastatin Oral Tablet 20 MG          Will file in chart as: SIMVASTATIN 20 MG Oral Tab    Sig: TAKE 1 TABLET BY MOUTH ONE TIME A DAY AT NIGHT    Disp:  30 tablet (Pharmacy requested: 30)    Refills:  0    Start: 4/9/2019    Class: Nor

## 2019-05-03 NOTE — PROGRESS NOTES
Author  is a 77year old female. HPI:   She presents for f/u on chronic conditions and refills of medication. She returned from a 6-7mo stay in [facility]. C/o chronic fatigue, has not been taking her supplements regularly.  Less active per son due aspirin 81 MG Oral Tab EC Take 1 tablet (81 mg total) by mouth daily.  Disp: 90 tablet Rfl: 0   METOPROLOL TARTRATE 100 MG Oral Tab TAKE 1 TABLET BY MOUTH TWO TIMES A DAY  Disp: 60 tablet Rfl: 1   ALPRAZOLAM 0.5 MG Oral Tab TAKE 1 TABLET BY MOUTH AT BEDTI COMP METABOLIC PANEL (14); Future  - HEMOGLOBIN A1C; Future    5. OAB (overactive bladder)  - stable  - cpm  - Oxybutynin Chloride 5 MG Oral Tab; TAKE 1 TABLET BY MOUTH TWO TIMES A DAY  Dispense: 60 tablet; Refill: 2    6.  Anxiety  - stable  - denies SI/HI

## 2019-06-04 NOTE — TELEPHONE ENCOUNTER
LOV 5-03-19    LAST LAB  5-31-18 30*0    LAST RX 5-3-19     Next OV No future appointments.     PROTOCOL  Name from pharmacy: Simvastatin Oral Tablet 20 MG          Will file in chart as: SIMVASTATIN 20 MG Oral Tab    Sig: TAKE 1 TABLET BY MOUTH IN THE EVEN

## 2019-06-07 NOTE — TELEPHONE ENCOUNTER
Son, Gabby Pollard, called to question this refill, I explained that she needs to have her fasting labs done. He states she has been on this medication for years and we should not be \"holding her script hostage\" for her to have labs drawn.       Transferred to t

## 2019-06-07 NOTE — TELEPHONE ENCOUNTER
Name from pharmacy: Vitamin D (Ergocalciferol) Oral Capsule 39096 UNIT          Will file in chart as: ERGOCALCIFEROL 46804 units Oral Cap    The source prescription was discontinued on 4/12/2019 by Pedro Alves LPN for the following reason: Therapy c

## 2019-06-11 NOTE — TELEPHONE ENCOUNTER
LOV 5-3-19    LAST LAB 6-8-19     LAST RX 5-23-18 4tabs *11  5-3-19 30*0    Next OV No future appointments.     PROTOCOL    alendronate 70 MG Oral Tab          Sig: N/A    Disp:  4 tablet    Refills:  11    Start: 6/7/2019    Class: Normal    Non-formulary

## 2019-06-20 ENCOUNTER — TELEPHONE (OUTPATIENT)
Dept: FAMILY MEDICINE CLINIC | Facility: CLINIC | Age: 79
End: 2019-06-20

## 2020-06-30 NOTE — TELEPHONE ENCOUNTER
Rx redone no received by pharmacy. Airway patent, Nasal mucosa clear. Mouth with normal mucosa. Throat has no vesicles, no oropharyngeal exudates and uvula is midline.

## (undated) NOTE — ED AVS SNAPSHOT
[facility] in [address] [place] [address] Drive,[address] Floor    [address] [facility]    Phone:  [phone]    Fax:  [phone]           Megan Patch   MRN: [id number]    Department:  [facility] END   Date of Visit:  4/30/2017 [facility] [place], [address]  [phone] [address]  Johnnie [address]aza, [address] [place] [address]  [phone] [address] [place] [address]. Manpreet Simpson 1   [phone]       To Check ER Wait Times:  TEXT 'ERwait' to 26 reading, you will be contacted. Please make sure we have your correct phone number before you leave. After you leave, you should follow the attached instructions. I have read and understand the instructions given to me by my caregivers.         24-Hour XR CHEST PA + LAT CHEST (CPT=71020) (Final result) Result time:  04/30/17 15:54:28    Final result    Impression:    CONCLUSION:  No acute cardiopulmonary process.            Dictated by: Ricardo Cruz MD on 4/30/2017 at 15:53       Approved by: Kal Chinchilla

## (undated) NOTE — ED AVS SNAPSHOT
[facility] Emergency Department    [place]  [address] [address]    Phone:  [phone]    Fax:  [phone]           Mary Jo España   MRN: [id number]    Department:  [facility] Emergency Department   Date of Visit:  4/30 To Check ER Wait Times:  TEXT 'ERwait' to 49222      Click [email or web address]      Or call [phone]    If you have any problems with your follow-up, please call our  at [phone]    Si usted tiene algun problema con berger sequimiento, por f I have read and understand the instructions given to me by my caregivers. 24-Hour Pharmacies        Pharmacy Address Phone Number   [address]  [address] ([address]) Baron Kay visit,  view other health information, and more. To sign up or find more information, go to [email or web address] and click on the Sign Up Now link in the [employer] box.      Enter your [employer] Activation Code exactly as it appears below along with yo

## (undated) NOTE — MR AVS SNAPSHOT
[facility]  [address] [facility] [place], [facility] [address]               Thank you for choosing us for your health care visit with Noemi Steven DO.   We are glad to serve you and happy to Follow these guidelines when caring for yourself at home:  · If your symptoms are severe, rest at home for the first 2 to 3 days. When you go back to your usual activities, don't let yourself get too tired. · Do not smoke.  Also avoid being exposed to seco · Fever of 100.4°F (38°C) or higher  · Coughing up increased amounts of colored sputum  · Weakness, drowsiness, headache, facial pain, ear pain, or a stiff neck  Call 911, or get immediate medical care  Contact emergency services right away if any of these In later years, both men and women need to take extra care of their bones. By this point, the body loses more bone than it makes. If too much bone is lost, you may be at risk for fractures. With age, the quality and quantity of bone declines.  You can lesse Commonly known as:  ZOCOR                Where to Get Your Medications      These medications were sent to [address] [place] [address]/ [facility], [address] [facility] [address]  [address] , [phone], [address], ABDIRIZAK LOOMIS Moderation of alcohol consumption Men: limit to <= 2 drinks* per day. Women and lighter weight persons: limit to <= 1 drink* per day.          Healthy Diet and Regular Exercise  The [employer] of [address] [place] [address] for making healthy food choices  -   Enj

## (undated) NOTE — LETTER
05/29/18        Spencer Hammans  [address]  [facility] [address]      Dear Samson Sanon records indicate that you have outstanding lab work and or testing that was ordered for you and has not yet been completed:          Vitamin B12 [E]      Comp

## (undated) NOTE — ED AVS SNAPSHOT
[facility] Emergency Department    [place]  [address] [address]    Phone:  [phone]    Fax:  [phone]           Edie Sheriff   MRN: [id number]    Department:  [facility] Emergency Department   Date of Visit:  4/30 IF THERE IS ANY CHANGE OR WORSENING OF YOUR CONDITION, CALL YOUR PRIMARY CARE PHYSICIAN AT ONCE OR RETURN IMMEDIATELY TO THE EMERGENCY DEPARTMENT.     If you have been prescribed any medication(s), please fill your prescription right away and begin taking t